# Patient Record
Sex: MALE | Race: WHITE | NOT HISPANIC OR LATINO | URBAN - METROPOLITAN AREA
[De-identification: names, ages, dates, MRNs, and addresses within clinical notes are randomized per-mention and may not be internally consistent; named-entity substitution may affect disease eponyms.]

---

## 2017-12-20 ENCOUNTER — APPOINTMENT (OUTPATIENT)
Dept: URBAN - METROPOLITAN AREA CLINIC 200 | Age: 57
Setting detail: DERMATOLOGY
End: 2017-12-29

## 2017-12-20 DIAGNOSIS — L57.8 OTHER SKIN CHANGES DUE TO CHRONIC EXPOSURE TO NONIONIZING RADIATION: ICD-10-CM

## 2017-12-20 PROCEDURE — OTHER COUNSELING: OTHER

## 2017-12-20 PROCEDURE — 99213 OFFICE O/P EST LOW 20 MIN: CPT

## 2017-12-20 ASSESSMENT — LOCATION SIMPLE DESCRIPTION DERM: LOCATION SIMPLE: RIGHT UPPER BACK

## 2017-12-20 ASSESSMENT — LOCATION DETAILED DESCRIPTION DERM: LOCATION DETAILED: RIGHT MEDIAL UPPER BACK

## 2017-12-20 ASSESSMENT — LOCATION ZONE DERM: LOCATION ZONE: TRUNK

## 2018-03-26 RX ORDER — AMLODIPINE BESYLATE 5 MG/1
TABLET ORAL
Qty: 90 TABLET | Refills: 2 | Status: SHIPPED | OUTPATIENT
Start: 2018-03-26 | End: 2018-12-22 | Stop reason: SDUPTHER

## 2018-08-03 ENCOUNTER — APPOINTMENT (OUTPATIENT)
Dept: URBAN - METROPOLITAN AREA CLINIC 200 | Age: 58
Setting detail: DERMATOLOGY
End: 2018-08-16

## 2018-08-03 DIAGNOSIS — L82.1 OTHER SEBORRHEIC KERATOSIS: ICD-10-CM

## 2018-08-03 DIAGNOSIS — B07.0 PLANTAR WART: ICD-10-CM

## 2018-08-03 PROCEDURE — OTHER REASSURANCE: OTHER

## 2018-08-03 PROCEDURE — OTHER LIQUID NITROGEN: OTHER

## 2018-08-03 PROCEDURE — 99212 OFFICE O/P EST SF 10 MIN: CPT | Mod: 25

## 2018-08-03 PROCEDURE — 17110 DESTRUCT B9 LESION 1-14: CPT

## 2018-08-03 ASSESSMENT — LOCATION ZONE DERM
LOCATION ZONE: TOE
LOCATION ZONE: FACE

## 2018-08-03 ASSESSMENT — LOCATION DETAILED DESCRIPTION DERM
LOCATION DETAILED: LEFT MEDIAL MALAR CHEEK
LOCATION DETAILED: LEFT 2ND TOE

## 2018-08-03 ASSESSMENT — LOCATION SIMPLE DESCRIPTION DERM
LOCATION SIMPLE: LEFT CHEEK
LOCATION SIMPLE: LEFT FOOT

## 2018-08-04 ENCOUNTER — APPOINTMENT (EMERGENCY)
Dept: RADIOLOGY | Facility: HOSPITAL | Age: 58
End: 2018-08-04
Attending: EMERGENCY MEDICINE
Payer: COMMERCIAL

## 2018-08-04 ENCOUNTER — HOSPITAL ENCOUNTER (EMERGENCY)
Facility: HOSPITAL | Age: 58
Discharge: HOME | End: 2018-08-04
Attending: EMERGENCY MEDICINE
Payer: COMMERCIAL

## 2018-08-04 VITALS
BODY MASS INDEX: 28.7 KG/M2 | SYSTOLIC BLOOD PRESSURE: 164 MMHG | RESPIRATION RATE: 18 BRPM | OXYGEN SATURATION: 97 % | DIASTOLIC BLOOD PRESSURE: 95 MMHG | HEART RATE: 95 BPM | HEIGHT: 71 IN | WEIGHT: 205 LBS | TEMPERATURE: 96.8 F

## 2018-08-04 DIAGNOSIS — S80.11XA CONTUSION OF RIGHT LOWER LEG, INITIAL ENCOUNTER: ICD-10-CM

## 2018-08-04 DIAGNOSIS — S80.11XA TRAUMATIC HEMATOMA OF RIGHT LOWER LEG, INITIAL ENCOUNTER: Primary | ICD-10-CM

## 2018-08-04 PROCEDURE — 90471 IMMUNIZATION ADMIN: CPT | Performed by: PHYSICIAN ASSISTANT

## 2018-08-04 PROCEDURE — 99283 EMERGENCY DEPT VISIT LOW MDM: CPT | Mod: 25

## 2018-08-04 PROCEDURE — 90715 TDAP VACCINE 7 YRS/> IM: CPT | Performed by: PHYSICIAN ASSISTANT

## 2018-08-04 PROCEDURE — 63600000 HC DRUGS/DETAIL CODE: Performed by: PHYSICIAN ASSISTANT

## 2018-08-04 PROCEDURE — 73590 X-RAY EXAM OF LOWER LEG: CPT | Mod: RT

## 2018-08-04 RX ORDER — AMLODIPINE BESYLATE 5 MG/1
5 TABLET ORAL
COMMUNITY
Start: 2017-03-07 | End: 2018-08-16 | Stop reason: SDUPTHER

## 2018-08-04 RX ORDER — OLMESARTAN MEDOXOMIL 40 MG/1
40 TABLET ORAL
COMMUNITY
Start: 2017-07-19 | End: 2019-11-25 | Stop reason: ALTCHOICE

## 2018-08-04 RX ADMIN — TETANUS TOXOID, REDUCED DIPHTHERIA TOXOID AND ACELLULAR PERTUSSIS VACCINE, ADSORBED 0.5 ML: 5; 2.5; 8; 8; 2.5 SUSPENSION INTRAMUSCULAR at 11:44

## 2018-08-04 ASSESSMENT — ENCOUNTER SYMPTOMS
ARTHRALGIAS: 0
NUMBNESS: 0
FEVER: 0
WEAKNESS: 0
HEADACHES: 0
SHORTNESS OF BREATH: 0
ABDOMINAL PAIN: 0

## 2018-08-04 NOTE — ED ATTESTATION NOTE
Procedures  Physical Exam  Review of Systems    8/4/201811:43 AM  I have personally seen and examined the patient.  I reviewed and agree with the PA/NP/Resident's assessment and plan of care.    My examination, assessment, and plan of care of Braeden Paige is as follows:  The patient presents with right knee pain status post striking his knee against a deck board around 10:30 in the morning.  Exam: There is left medial proximal tib-fib swelling and ecchymosis.  There is no evidence of compartment syndrome.  Impression/Plan: Imaging    There is no evidence of a compartment syndrome at this time.  I reviewed with the patient that if the swelling increases and he develops any neurologic or vascular symptoms distally did return to the emergency department immediately.  Patient was able to repeat these instructions to make     I was physically present for the key/critical portions of the following procedures: None     William Jordan MD  08/04/18 1149

## 2018-08-04 NOTE — ED PROVIDER NOTES
"HPI     Chief Complaint   Patient presents with   • Knee Pain       57-year-old male presents with right lower extremity injury that occurred at approximately 10:30 AM.  Patient states he is working on his deck and had a board pulled up, his right leg went through the boards and scraped against a wooden deck board.  He is severe swelling just inferior to the right knee.  Patient reports no pain with range of motion of his knee.  He has 3 out of 10 pain, slightly worse with ambulation, however he is able to ambulate without assistance. Denies a fall, head injury, or complaints otherwise.  Last tetanus unknown.             Patient History     Past Medical History:   Diagnosis Date   • Hypercholesteremia    • Hypertension        History reviewed. No pertinent surgical history.    History reviewed. No pertinent family history.    Social History   Substance Use Topics   • Smoking status: Current Some Day Smoker     Types: Cigars   • Smokeless tobacco: Never Used   • Alcohol use Yes      Comment: 10 drinks a week       Systems Reviewed from Nursing Triage:  Tobacco  Allergies  Meds  Problems  Med Hx  Surg Hx  Soc Hx         Review of Systems     Review of Systems   Constitutional: Negative for fever.   Respiratory: Negative for shortness of breath.    Cardiovascular: Negative for chest pain.   Gastrointestinal: Negative for abdominal pain.   Musculoskeletal: Negative for arthralgias and gait problem.   Neurological: Negative for weakness, numbness and headaches.        Physical Exam     ED Triage Vitals [08/04/18 1053]   Temp Heart Rate Resp BP SpO2   (!) 36 °C (96.8 °F) 95 18 (!) 164/95 97 %      Temp Source Heart Rate Source Patient Position BP Location FiO2 (%) (Set)   Temporal -- Sitting Right upper arm --                     Patient Vitals for the past 24 hrs:   BP Temp Temp src Pulse Resp SpO2 Height Weight   08/04/18 1053 (!) 164/95 (!) 36 °C (96.8 °F) Temporal 95 18 97 % 1.803 m (5' 11\") 93 kg (205 lb) "           Physical Exam   Constitutional: He appears well-developed. No distress.   HENT:   Head: Atraumatic.   Eyes: Conjunctivae are normal.   Neck: Normal range of motion.   Pulmonary/Chest: Effort normal.   Musculoskeletal:        Legs:  Compartments soft, no signs of compartment syndrome   Neurological: He is alert.   Skin: Skin is warm and dry.   Psychiatric: He has a normal mood and affect. His behavior is normal.   Nursing note and vitals reviewed.           Procedures    ED Course & [facility]     Labs Reviewed - No data to display    X-RAY TIBIA FIBULA RIGHT 2 VIEWS    (Results Pending)           MDM         ED Course as of Aug 04 1857   Sat Aug 04, 2018   1120 58 y/o male with right prox tib swelling after scraping against deck boards this morning, xray, ice, tdap, pt declined pain medication at this time; pt requesting MRI, I told him we are unable to get this done on an emergent basis but we can refer him to orthopedics if he is to need an MRI for his injury; he also was concerned about DVT, this is unlikely immediately after an injury but knows he does have a slight risk of developing one over the next few weeks and understands he may need an ultrasound; I did offer an ultrasound today for reassurance but he declined   [JL]   1216 Xray negative, tdap ordered, wounds cleaned with sam kleanse, ace applied to right leg, abx/band aid to left ankle; pt able to ambulate, will d/c home; d/w and evaluated by DR. Jordan   [JL]      ED Course User Index  [JL] YUMIKO Monterroso         Clinical Impressions as of Aug 04 1857   Traumatic hematoma of right lower leg, initial encounter   Contusion of right lower leg, initial encounter        YUMIKO Monterroso  08/04/18 1902

## 2018-08-04 NOTE — DISCHARGE INSTRUCTIONS
Ice 20 minutes on, 20 minutes off. Elevate above heart level. Wear ace wrap to help with swelling. Use anti-inflammatory medication such as Advil or Aleve, please take with food.     Your tetanus was updated today, please make note for your record.     Please follow up with your primary care doctor or orthopedic doctor within 1 week.     If you develop new/worsening symptoms including severe pain, numbness, tingling, cold/pale foot, fever >100.4F, difficulty walking or other concerning symptoms, return to the ER.

## 2018-08-06 ENCOUNTER — TELEPHONE (OUTPATIENT)
Dept: FAMILY MEDICINE | Facility: CLINIC | Age: 58
End: 2018-08-06

## 2018-08-06 NOTE — TELEPHONE ENCOUNTER
LM for pt re: ER visit. Checking in to see how he is feeling and to make sure he understands his discharge instructions. Pt to call the office to schedule a follow-up visit, if indicated.

## 2018-08-16 ENCOUNTER — OFFICE VISIT (OUTPATIENT)
Dept: FAMILY MEDICINE | Facility: CLINIC | Age: 58
End: 2018-08-16
Payer: COMMERCIAL

## 2018-08-16 VITALS
TEMPERATURE: 97.9 F | DIASTOLIC BLOOD PRESSURE: 80 MMHG | SYSTOLIC BLOOD PRESSURE: 124 MMHG | HEIGHT: 71 IN | BODY MASS INDEX: 28.14 KG/M2 | WEIGHT: 201 LBS | RESPIRATION RATE: 15 BRPM | HEART RATE: 68 BPM

## 2018-08-16 DIAGNOSIS — E78.00 PURE HYPERCHOLESTEROLEMIA: ICD-10-CM

## 2018-08-16 DIAGNOSIS — S80.11XD HEMATOMA OF LEG, RIGHT, SUBSEQUENT ENCOUNTER: Primary | ICD-10-CM

## 2018-08-16 DIAGNOSIS — I10 BENIGN ESSENTIAL HYPERTENSION: ICD-10-CM

## 2018-08-16 PROCEDURE — 36415 COLL VENOUS BLD VENIPUNCTURE: CPT | Performed by: FAMILY MEDICINE

## 2018-08-16 PROCEDURE — 99213 OFFICE O/P EST LOW 20 MIN: CPT | Mod: 25 | Performed by: FAMILY MEDICINE

## 2018-08-16 ASSESSMENT — ENCOUNTER SYMPTOMS
ABDOMINAL PAIN: 0
NAUSEA: 0
SHORTNESS OF BREATH: 0
VOMITING: 0
CHEST TIGHTNESS: 0

## 2018-08-16 NOTE — PROGRESS NOTES
Subjective      Patient ID: Braeden Paige is a 57 y.o. male.    HPI patient is here to follow-up on a leg hematoma which she scraped on part of a desk a couple of weeks ago, he was seen at the emergency room, the ER records were reviewed.  Patient reports continued slow improvement in the size of the hematoma although it is still present, still mildly painful with a 4 out of 10.  No increased redness, swelling, or fever.  Patient also has a history of hypertension high cholesterol he is here to follow-up on those.  His blood pressure is better controlled today, he reports he changed his diet has lost some weight and is trying to exercise.  We also increased his Crestor last time and he is due for recheck on blood work.  He denies any chest pain, shortness of breath or any other symptoms.    Allergies  Meds  Problems       Review of Systems   Respiratory: Negative for chest tightness and shortness of breath.    Cardiovascular: Negative for chest pain.   Gastrointestinal: Negative for abdominal pain, nausea and vomiting.   Skin: Negative for rash.     Allergies   Allergen Reactions   • Ace Inhibitors      Other reaction(s): cough     Current Outpatient Prescriptions   Medication Sig Dispense Refill   • amLODIPine (NORVASC) 5 mg tablet TAKE 1 TABLET BY ORAL ROUTE  EVERY DAY 90 tablet 2   • rosuvastatin (CRESTOR) 10 mg tablet TAKE ONE TABLET BY MOUTH ONCE DAILY  30 tablet 5     No current facility-administered medications for this visit.      Past Medical History:   Diagnosis Date   • Hypercholesteremia    • Hypertension      No past surgical history on file.  Social History   Substance Use Topics   • Smoking status: Current Some Day Smoker     Types: Cigars   • Smokeless tobacco: Never Used   • Alcohol use Yes      Comment: 10 drinks a week     No family history on file.    Objective   Vitals:    08/16/18 1123   BP: 124/80   Pulse: 68   Resp: 15   Temp: 36.6 °C (97.9 °F)   Weight: 91.2 kg (201 lb)   Height: 1.803  "m (5' 11\")    Body mass index is 28.03 kg/m².  Physical Exam   Constitutional: He is oriented to person, place, and time. He appears well-developed. No distress.   Musculoskeletal:   Right lower extremity with healing superficial scrapes along the distal medial tibia, there is a large area of soft tissue swelling roughly 10 x 5 cm oval overlying the medial proximal tibia, there is some mild overlying hyperemia without significant warmth or redness.  It is only very mildly tender to palpation.   Neurological: He is alert and oriented to person, place, and time.   Psychiatric: He has a normal mood and affect. His speech is normal and behavior is normal.       Assessment/Plan   Problem List Items Addressed This Visit     Benign essential hypertension    Pure hypercholesterolemia    Relevant Orders    Lipid panel    Comprehensive metabolic panel      Other Visit Diagnoses     Hematoma of leg, right, subsequent encounter    -  Primary      I discussed with the patient that I see no signs of infection, hematoma of the size will take a couple of months to completely resolve.  Follow-up if he has any concerns over its improvement.  His blood pressure is well controlled and we will recheck blood work today to reevaluate his cholesterol numbers on a higher dose of Crestor.  Continue current medications.    Patient agrees and verbalizes understanding of medication and treatment plan discussed at today's visit.  Patient was instructed to call or follow-up if symptoms persist or worsen.    No notes on file    Schuyler Butt, DO    This note was created with voice recognition software. Inadvertent dictation errors should be disregarded. Please contact my office for clarification.  "

## 2018-08-17 LAB
ALBUMIN SERPL-MCNC: 4.4 G/DL (ref 3.5–5.5)
ALBUMIN/GLOB SERPL: 1.7 {RATIO} (ref 1.2–2.2)
ALP SERPL-CCNC: 62 IU/L (ref 39–117)
ALT SERPL-CCNC: 25 IU/L (ref 0–44)
AST SERPL-CCNC: 22 IU/L (ref 0–40)
BILIRUB SERPL-MCNC: 0.5 MG/DL (ref 0–1.2)
BUN SERPL-MCNC: 16 MG/DL (ref 6–24)
BUN/CREAT SERPL: 18 (ref 9–20)
CALCIUM SERPL-MCNC: 9.5 MG/DL (ref 8.7–10.2)
CHLORIDE SERPL-SCNC: 99 MMOL/L (ref 96–106)
CHOLEST SERPL-MCNC: 189 MG/DL (ref 100–199)
CO2 SERPL-SCNC: 24 MMOL/L (ref 20–29)
CREAT SERPL-MCNC: 0.89 MG/DL (ref 0.76–1.27)
GLOBULIN SER CALC-MCNC: 2.6 G/DL (ref 1.5–4.5)
GLUCOSE SERPL-MCNC: 91 MG/DL (ref 65–99)
HDLC SERPL-MCNC: 61 MG/DL
LAB CORP EGFR IF AFRICN AM: 108 ML/MIN/1.73
LAB CORP EGFR IF NONAFRICN AM: 94 ML/MIN/1.73
LDLC SERPL CALC-MCNC: 113 MG/DL (ref 0–99)
POTASSIUM SERPL-SCNC: 4.8 MMOL/L (ref 3.5–5.2)
PROT SERPL-MCNC: 7 G/DL (ref 6–8.5)
SODIUM SERPL-SCNC: 142 MMOL/L (ref 134–144)
TRIGL SERPL-MCNC: 75 MG/DL (ref 0–149)
VLDLC SERPL CALC-MCNC: 15 MG/DL (ref 5–40)

## 2018-08-20 RX ORDER — OLMESARTAN MEDOXOMIL 40 MG/1
TABLET ORAL
Qty: 30 TABLET | Refills: 5 | Status: SHIPPED | OUTPATIENT
Start: 2018-08-20 | End: 2018-11-28 | Stop reason: SDUPTHER

## 2018-08-20 NOTE — TELEPHONE ENCOUNTER
----- Message from Schuyler Butt DO sent at 8/20/2018  7:21 AM EDT -----  Please notify patient that his cholesterol numbers were much better, total was 189, the bad one is down to 113, and his liver function tests are normal therefore continue current Crestor dose.

## 2018-08-20 NOTE — PROGRESS NOTES
Please notify patient that his cholesterol numbers were much better, total was 189, the bad one is down to 113, and his liver function tests are normal therefore continue current Crestor dose.

## 2018-08-30 ENCOUNTER — TELEPHONE (OUTPATIENT)
Dept: FAMILY MEDICINE | Facility: CLINIC | Age: 58
End: 2018-08-30

## 2018-08-30 NOTE — TELEPHONE ENCOUNTER
[facility] called they will be following up with patient upon arrival from [facility]. Patient should arrive home on Sat or Sun.

## 2018-09-06 ENCOUNTER — OFFICE VISIT (OUTPATIENT)
Dept: FAMILY MEDICINE | Facility: CLINIC | Age: 58
End: 2018-09-06
Payer: COMMERCIAL

## 2018-09-06 VITALS
HEIGHT: 71 IN | HEART RATE: 80 BPM | TEMPERATURE: 98.1 F | DIASTOLIC BLOOD PRESSURE: 78 MMHG | RESPIRATION RATE: 16 BRPM | WEIGHT: 192.6 LBS | BODY MASS INDEX: 26.96 KG/M2 | SYSTOLIC BLOOD PRESSURE: 142 MMHG

## 2018-09-06 DIAGNOSIS — D72.829 LEUKOCYTOSIS, UNSPECIFIED TYPE: ICD-10-CM

## 2018-09-06 DIAGNOSIS — L03.115 CELLULITIS OF RIGHT LOWER EXTREMITY: Primary | ICD-10-CM

## 2018-09-06 PROCEDURE — 99213 OFFICE O/P EST LOW 20 MIN: CPT | Performed by: FAMILY MEDICINE

## 2018-09-06 RX ORDER — CLINDAMYCIN HYDROCHLORIDE 300 MG/1
300 CAPSULE ORAL
COMMUNITY
Start: 2018-08-30 | End: 2018-09-06 | Stop reason: SDUPTHER

## 2018-09-06 RX ORDER — CLINDAMYCIN HYDROCHLORIDE 300 MG/1
300 CAPSULE ORAL 3 TIMES DAILY
Qty: 21 CAPSULE | Refills: 0 | Status: SHIPPED | OUTPATIENT
Start: 2018-09-06 | End: 2019-11-25 | Stop reason: ALTCHOICE

## 2018-09-06 NOTE — PROGRESS NOTES
"Subjective      Patient ID: Braeden Paige is a 57 y.o. male.    HPI patient reports that while on vacation the hematoma that was present on his right anterior leg became infected, he began to have more pain, redness actually began to run a fever and had some nausea.  He was admitted for 4 days, an abscess was found on CAT scan, surgery was consulted, they performed an incision and drainage and left the wound open.  He was treated with Rocephin and clindamycin, discharged on clindamycin.  His white count was elevated as high as 24,000 but improved somewhat during his hospital stay.  He has been having wound care and nurses coming for wound care.    Allergies  Meds  Problems       Review of Systems  Allergies   Allergen Reactions   • Ace Inhibitors      Other reaction(s): cough     Current Outpatient Prescriptions   Medication Sig Dispense Refill   • amLODIPine (NORVASC) 5 mg tablet TAKE 1 TABLET BY ORAL ROUTE  EVERY DAY 90 tablet 2   • clindamycin (CLEOCIN) 300 mg capsule Take 1 capsule (300 mg total) by mouth 3 (three) times a day for 7 days. 21 capsule 0   • olmesartan (BENICAR) 40 mg tablet TAKE 1 TABLET BY MOUTH EVERY DAY 30 tablet 5   • rosuvastatin (CRESTOR) 10 mg tablet TAKE ONE TABLET BY MOUTH ONCE DAILY  30 tablet 5     No current facility-administered medications for this visit.      Past Medical History:   Diagnosis Date   • Hypercholesteremia    • Hypertension      No past surgical history on file.  Social History   Substance Use Topics   • Smoking status: Current Some Day Smoker     Types: Cigars   • Smokeless tobacco: Never Used   • Alcohol use Yes      Comment: 10 drinks a week     No family history on file.    Objective   Vitals:    09/06/18 1347   BP: (!) 142/78   Pulse: 80   Resp: 16   Temp: 36.7 °C (98.1 °F)   Weight: 87.4 kg (192 lb 9.6 oz)   Height: 1.803 m (5' 11\")    Body mass index is 26.86 kg/m².  Physical Exam   Constitutional: He is oriented to person, place, and time. He appears " well-developed. No distress.   Musculoskeletal:   Patient's right leg was examined, he still has some soft tissue swelling present just below the knee anterior and medial just above the wound, the wound is roughly 2 cm long, packed with normal saline, no significant surrounding redness, residual soft tissue swelling is present.  No warmth.   Neurological: He is alert and oriented to person, place, and time.   Psychiatric: He has a normal mood and affect. His speech is normal and behavior is normal.       Assessment/Plan   Problem List Items Addressed This Visit     None      Visit Diagnoses     Cellulitis of right lower extremity    -  Primary    Relevant Orders    CBC and Differential    Leukocytosis, unspecified type          Patient's infection has significantly improved clinically, I would like to continue clindamycin for 7 more days at 3 times a day for continued treatment and because of his significant wound.  Patient is comfortable doing wound care himself and therefore he may return to work on Monday.  He understands to keep the wound protected and clean with twice daily dressing changes.  Recheck CBC today.    Patient agrees and verbalizes understanding of medication and treatment plan discussed at today's visit.  Patient was instructed to call or follow-up if symptoms persist or worsen.    No notes on file    Schuyler Butt, DO    This note was created with voice recognition software. Inadvertent dictation errors should be disregarded. Please contact my office for clarification.

## 2018-09-07 LAB
BASOPHILS # BLD AUTO: 0 X10E3/UL (ref 0–0.2)
BASOPHILS NFR BLD AUTO: 0 %
EOSINOPHIL # BLD AUTO: 0.1 X10E3/UL (ref 0–0.4)
EOSINOPHIL NFR BLD AUTO: 1 %
ERYTHROCYTE [DISTWIDTH] IN BLOOD BY AUTOMATED COUNT: 13.4 % (ref 12.3–15.4)
HCT VFR BLD AUTO: 43.6 % (ref 37.5–51)
HGB BLD-MCNC: 14.7 G/DL (ref 13–17.7)
IMM GRANULOCYTES # BLD: 0.1 X10E3/UL (ref 0–0.1)
IMM GRANULOCYTES NFR BLD: 1 %
LYMPHOCYTES # BLD AUTO: 1.6 X10E3/UL (ref 0.7–3.1)
LYMPHOCYTES NFR BLD AUTO: 15 %
MCH RBC QN AUTO: 31.3 PG (ref 26.6–33)
MCHC RBC AUTO-ENTMCNC: 33.7 G/DL (ref 31.5–35.7)
MCV RBC AUTO: 93 FL (ref 79–97)
MONOCYTES # BLD AUTO: 0.9 X10E3/UL (ref 0.1–0.9)
MONOCYTES NFR BLD AUTO: 9 %
NEUTROPHILS # BLD AUTO: 7.8 X10E3/UL (ref 1.4–7)
NEUTROPHILS NFR BLD AUTO: 74 %
PLATELET # BLD AUTO: 374 X10E3/UL (ref 150–379)
RBC # BLD AUTO: 4.7 X10E6/UL (ref 4.14–5.8)
WBC # BLD AUTO: 10.5 X10E3/UL (ref 3.4–10.8)

## 2018-10-12 ENCOUNTER — OFFICE VISIT (OUTPATIENT)
Dept: FAMILY MEDICINE | Facility: CLINIC | Age: 58
End: 2018-10-12
Payer: COMMERCIAL

## 2018-10-12 VITALS
TEMPERATURE: 98.2 F | HEIGHT: 71 IN | BODY MASS INDEX: 28 KG/M2 | SYSTOLIC BLOOD PRESSURE: 124 MMHG | WEIGHT: 200 LBS | HEART RATE: 84 BPM | DIASTOLIC BLOOD PRESSURE: 90 MMHG | RESPIRATION RATE: 13 BRPM

## 2018-10-12 DIAGNOSIS — S80.11XS HEMATOMA OF LEG, RIGHT, SEQUELA: Primary | ICD-10-CM

## 2018-10-12 PROCEDURE — 90686 IIV4 VACC NO PRSV 0.5 ML IM: CPT | Performed by: FAMILY MEDICINE

## 2018-10-12 PROCEDURE — 99213 OFFICE O/P EST LOW 20 MIN: CPT | Mod: 25 | Performed by: FAMILY MEDICINE

## 2018-10-12 PROCEDURE — 90471 IMMUNIZATION ADMIN: CPT | Performed by: FAMILY MEDICINE

## 2018-10-12 NOTE — PROGRESS NOTES
"Subjective      Patient ID: Braeden Paige is a 58 y.o. male.    HPI patient is here to follow-up on his leg injury.  He continues to have an abnormal swelling above the level of the injury, the surgical site has healed very well.  He is not having pain at all in the leg whatsoever, his activities are full, he was just wondering about the swelling he has.    Allergies  Meds  Problems       Review of Systems  Allergies   Allergen Reactions   • Ace Inhibitors      Other reaction(s): cough     Current Outpatient Prescriptions   Medication Sig Dispense Refill   • amLODIPine (NORVASC) 5 mg tablet TAKE 1 TABLET BY ORAL ROUTE  EVERY DAY 90 tablet 2   • olmesartan (BENICAR) 40 mg tablet TAKE 1 TABLET BY MOUTH EVERY DAY 30 tablet 5   • rosuvastatin (CRESTOR) 10 mg tablet TAKE ONE TABLET BY MOUTH ONCE DAILY  30 tablet 5     No current facility-administered medications for this visit.      Past Medical History:   Diagnosis Date   • Hypercholesteremia    • Hypertension      No past surgical history on file.  Social History   Substance Use Topics   • Smoking status: Current Some Day Smoker     Types: Cigars   • Smokeless tobacco: Never Used   • Alcohol use Yes      Comment: 10 drinks a week     No family history on file.    Objective   Vitals:    10/12/18 1017   BP: 124/90   Pulse: 84   Resp: 13   Temp: 36.8 °C (98.2 °F)   Weight: 90.7 kg (200 lb)   Height: 1.803 m (5' 11\")    Body mass index is 27.89 kg/m².  Physical Exam   Constitutional: He is oriented to person, place, and time. He appears well-developed. No distress.   Musculoskeletal:   Lateral lower extremities examined, right leg displays a 2 x 3 cm rounded area of prominent tissue that is firm to the touch just above the surgical site.  Surgical site is well-healed, there is trace lower extremity edema below.  No redness or warmth.  No tenderness whatsoever.   Neurological: He is alert and oriented to person, place, and time.   Psychiatric: He has a normal mood and " affect. His speech is normal and behavior is normal.       Assessment/Plan   Problem List Items Addressed This Visit     None      Visit Diagnoses     Hematoma of leg, right, sequela    -  Primary      I believe the area that is firm to palpation is likely periosteal reaction of the proximal tibia, the hematoma has resolved.  The wound has healed and he has no tenderness.  I have reassured the patient that I believe this will take months to remodel.  If any symptoms worsen or this area enlarges he is to follow-up for further evaluation.  Flu shot given.    Patient agrees and verbalizes understanding of medication and treatment plan discussed at today's visit.  Patient was instructed to call or follow-up if symptoms persist or worsen.    No notes on file    Schuyler Butt, DO    This note was created with voice recognition software. Inadvertent dictation errors should be disregarded. Please contact my office for clarification.

## 2018-11-12 RX ORDER — ROSUVASTATIN CALCIUM 10 MG/1
10 TABLET, COATED ORAL
Qty: 30 TABLET | Refills: 5 | Status: SHIPPED | OUTPATIENT
Start: 2018-11-12 | End: 2018-11-28 | Stop reason: SDUPTHER

## 2018-11-28 RX ORDER — OLMESARTAN MEDOXOMIL 40 MG/1
40 TABLET ORAL
Qty: 90 TABLET | Refills: 1 | Status: SHIPPED | OUTPATIENT
Start: 2018-11-28 | End: 2019-11-25 | Stop reason: ALTCHOICE

## 2018-11-28 RX ORDER — ROSUVASTATIN CALCIUM 10 MG/1
10 TABLET, COATED ORAL
Qty: 30 TABLET | Refills: 1 | Status: SHIPPED | OUTPATIENT
Start: 2018-11-28 | End: 2019-01-15 | Stop reason: SDUPTHER

## 2018-12-17 ENCOUNTER — APPOINTMENT (OUTPATIENT)
Dept: URBAN - METROPOLITAN AREA CLINIC 200 | Age: 58
Setting detail: DERMATOLOGY
End: 2018-12-19

## 2018-12-17 DIAGNOSIS — L82.0 INFLAMED SEBORRHEIC KERATOSIS: ICD-10-CM

## 2018-12-17 DIAGNOSIS — L57.8 OTHER SKIN CHANGES DUE TO CHRONIC EXPOSURE TO NONIONIZING RADIATION: ICD-10-CM

## 2018-12-17 PROBLEM — D23.9 OTHER BENIGN NEOPLASM OF SKIN, UNSPECIFIED: Status: ACTIVE | Noted: 2018-12-17

## 2018-12-17 PROCEDURE — OTHER REASSURANCE: OTHER

## 2018-12-17 PROCEDURE — 99213 OFFICE O/P EST LOW 20 MIN: CPT

## 2018-12-17 PROCEDURE — OTHER MIPS QUALITY: OTHER

## 2018-12-17 PROCEDURE — OTHER COUNSELING: OTHER

## 2018-12-17 ASSESSMENT — LOCATION ZONE DERM
LOCATION ZONE: TRUNK
LOCATION ZONE: ARM

## 2018-12-17 ASSESSMENT — LOCATION DETAILED DESCRIPTION DERM
LOCATION DETAILED: LEFT MEDIAL SUPERIOR CHEST
LOCATION DETAILED: RIGHT ANTERIOR PROXIMAL UPPER ARM

## 2018-12-17 ASSESSMENT — LOCATION SIMPLE DESCRIPTION DERM
LOCATION SIMPLE: RIGHT UPPER ARM
LOCATION SIMPLE: CHEST

## 2019-01-15 RX ORDER — ROSUVASTATIN CALCIUM 10 MG/1
10 TABLET, COATED ORAL
Qty: 90 TABLET | Refills: 0 | Status: SHIPPED | OUTPATIENT
Start: 2019-01-15 | End: 2019-05-06 | Stop reason: SDUPTHER

## 2019-01-25 ENCOUNTER — OFFICE VISIT (OUTPATIENT)
Dept: FAMILY MEDICINE | Facility: CLINIC | Age: 59
End: 2019-01-25
Payer: COMMERCIAL

## 2019-01-25 VITALS
RESPIRATION RATE: 14 BRPM | HEART RATE: 68 BPM | WEIGHT: 206 LBS | BODY MASS INDEX: 28.84 KG/M2 | TEMPERATURE: 98.1 F | DIASTOLIC BLOOD PRESSURE: 84 MMHG | SYSTOLIC BLOOD PRESSURE: 122 MMHG | HEIGHT: 71 IN

## 2019-01-25 DIAGNOSIS — J01.90 ACUTE NON-RECURRENT SINUSITIS, UNSPECIFIED LOCATION: Primary | ICD-10-CM

## 2019-01-25 DIAGNOSIS — R73.9 HYPERGLYCEMIA: ICD-10-CM

## 2019-01-25 PROCEDURE — 99214 OFFICE O/P EST MOD 30 MIN: CPT | Performed by: FAMILY MEDICINE

## 2019-01-25 RX ORDER — AMOXICILLIN 875 MG/1
875 TABLET, FILM COATED ORAL 2 TIMES DAILY
Qty: 20 TABLET | Refills: 0 | Status: SHIPPED | OUTPATIENT
Start: 2019-01-25 | End: 2019-02-04 | Stop reason: ALTCHOICE

## 2019-01-25 NOTE — PROGRESS NOTES
"Subjective      Patient ID: Braeden Paige is a 58 y.o. male.    HPI patient is here for sinus symptoms that are been present for about 7 days.  He has runny nose, nasal congestion, lots of sinus drainage and some pressure.  Really has not improved significantly.  He has had no fever or chest pain or shortness of breath.  He does have a cough.  No nausea, vomiting, or diarrhea.  He also has a history of borderline blood sugars and his vision had changed recently so he wanted to have his blood sugar checked.    Allergies  Meds  Problems       Review of Systems  Allergies   Allergen Reactions   • Ace Inhibitors      Other reaction(s): cough     Current Outpatient Prescriptions   Medication Sig Dispense Refill   • amLODIPine (NORVASC) 5 mg tablet TAKE ONE TABLET BY MOUTH ONE TIME DAILY  90 tablet 3   • amoxicillin (AMOXIL) 875 mg tablet Take 1 tablet (875 mg total) by mouth 2 (two) times a day for 10 days. 20 tablet 0   • olmesartan (BENICAR) 40 mg tablet Take 1 tablet (40 mg total) by mouth once daily. 90 tablet 1   • rosuvastatin (CRESTOR) 10 mg tablet Take 1 tablet (10 mg total) by mouth once daily. 90 tablet 0     No current facility-administered medications for this visit.      Past Medical History:   Diagnosis Date   • Hypercholesteremia    • Hypertension      No past surgical history on file.  Social History   Substance Use Topics   • Smoking status: Current Some Day Smoker     Types: Cigars   • Smokeless tobacco: Never Used   • Alcohol use Yes      Comment: 10 drinks a week     No family history on file.    Objective   Vitals:    01/25/19 1253   BP: 122/84   Pulse: 68   Resp: 14   Temp: 36.7 °C (98.1 °F)   Weight: 93.4 kg (206 lb)   Height: 1.803 m (5' 11\")    Body mass index is 28.73 kg/m².  Physical Exam   Constitutional: He appears well-developed.  Non-toxic appearance. No distress.   HENT:   Head: Normocephalic.   Right Ear: Tympanic membrane, external ear and ear canal normal.   Left Ear: Tympanic " membrane, external ear and ear canal normal.   Nose: Nose normal.   Mouth/Throat: Uvula is midline, oropharynx is clear and moist and mucous membranes are normal. No oral lesions. No oropharyngeal exudate, posterior oropharyngeal edema, posterior oropharyngeal erythema or tonsillar abscesses.   Tongue normal   Eyes: Conjunctivae and lids are normal.   Neck: Neck supple. No thyroid mass and no thyromegaly present.   Cardiovascular: Normal rate, regular rhythm and normal heart sounds.    No murmur heard.  Pulmonary/Chest: Effort normal and breath sounds normal. No respiratory distress.   Lymphadenopathy:        Head (right side): No submental, no submandibular, no preauricular, no posterior auricular and no occipital adenopathy present.        Head (left side): No submental, no submandibular, no preauricular, no posterior auricular and no occipital adenopathy present.     He has no cervical adenopathy.   Psychiatric: He has a normal mood and affect.   Alert and oriented       Assessment/Plan   Problem List Items Addressed This Visit     None      Visit Diagnoses     Acute non-recurrent sinusitis, unspecified location    -  Primary    Hyperglycemia        Relevant Orders    Hemoglobin A1c      Treat sinusitis with amoxicillin twice daily for 10 days, check hemoglobin A1c today for further evaluation.    Patient agrees and verbalizes understanding of medication and treatment plan discussed at today's visit.  Patient was instructed to call or follow-up if symptoms persist or worsen.    No notes on file    Schuyler Butt, DO    This note was created with voice recognition software. Inadvertent dictation errors should be disregarded. Please contact my office for clarification.

## 2019-01-26 LAB — HBA1C MFR BLD: 5.8 % (ref 4.8–5.6)

## 2019-01-28 NOTE — PROGRESS NOTES
Braeden, your long-term blood sugar test was slightly higher than last time at 5.8, but I do not think that significant enough to cause changes in your vision.

## 2019-02-04 ENCOUNTER — TELEPHONE (OUTPATIENT)
Dept: FAMILY MEDICINE | Facility: CLINIC | Age: 59
End: 2019-02-04

## 2019-02-04 RX ORDER — AZITHROMYCIN 250 MG/1
TABLET, FILM COATED ORAL
Qty: 6 TABLET | Refills: 0 | Status: SHIPPED | OUTPATIENT
Start: 2019-02-04 | End: 2019-02-13 | Stop reason: SDUPTHER

## 2019-02-13 ENCOUNTER — TELEPHONE (OUTPATIENT)
Dept: FAMILY MEDICINE | Facility: CLINIC | Age: 59
End: 2019-02-13

## 2019-02-13 RX ORDER — AZITHROMYCIN 250 MG/1
TABLET, FILM COATED ORAL
Qty: 6 TABLET | Refills: 0 | Status: SHIPPED | OUTPATIENT
Start: 2019-02-13 | End: 2019-11-25 | Stop reason: ALTCHOICE

## 2019-02-22 ENCOUNTER — APPOINTMENT (OUTPATIENT)
Dept: URBAN - METROPOLITAN AREA CLINIC 200 | Age: 59
Setting detail: DERMATOLOGY
End: 2019-02-26

## 2019-02-22 ENCOUNTER — RX ONLY (RX ONLY)
Age: 59
End: 2019-02-22

## 2019-02-22 DIAGNOSIS — L82.1 OTHER SEBORRHEIC KERATOSIS: ICD-10-CM

## 2019-02-22 PROCEDURE — OTHER BENIGN DESTRUCTION COSMETIC: OTHER

## 2019-02-22 RX ORDER — NYSTATIN AND TRIAMCINOLONE ACETONIDE 100000; 1 [USP'U]/G; MG/G
OINTMENT TOPICAL
Qty: 1 | Refills: 2 | Status: ERX | COMMUNITY
Start: 2019-02-22

## 2019-02-22 ASSESSMENT — LOCATION DETAILED DESCRIPTION DERM
LOCATION DETAILED: LEFT MEDIAL MALAR CHEEK
LOCATION DETAILED: LEFT SUPERIOR LATERAL MALAR CHEEK

## 2019-02-22 ASSESSMENT — LOCATION SIMPLE DESCRIPTION DERM: LOCATION SIMPLE: LEFT CHEEK

## 2019-02-22 ASSESSMENT — LOCATION ZONE DERM: LOCATION ZONE: FACE

## 2019-02-22 NOTE — PROCEDURE: BENIGN DESTRUCTION COSMETIC
Detail Level: Zone
Post-Care Instructions: I reviewed with the patient in detail post-care instructions. Patient is to wear sunprotection, and avoid picking at any of the treated lesions. Pt may apply Vaseline to crusted or scabbing areas.
Price (Use Numbers Only, No Special Characters Or $): 25.00
Consent: The patient's consent was obtained including but not limited to risks of crusting, scabbing, blistering, scarring, darker or lighter pigmentary change, recurrence, incomplete removal and infection.
Anesthesia Volume In Cc: 0

## 2019-03-01 ENCOUNTER — OFFICE VISIT (OUTPATIENT)
Dept: FAMILY MEDICINE | Facility: CLINIC | Age: 59
End: 2019-03-01
Payer: COMMERCIAL

## 2019-03-01 VITALS
BODY MASS INDEX: 29.54 KG/M2 | SYSTOLIC BLOOD PRESSURE: 126 MMHG | HEART RATE: 72 BPM | RESPIRATION RATE: 13 BRPM | WEIGHT: 211 LBS | DIASTOLIC BLOOD PRESSURE: 84 MMHG | TEMPERATURE: 98.1 F | HEIGHT: 71 IN

## 2019-03-01 DIAGNOSIS — J01.90 ACUTE NON-RECURRENT SINUSITIS, UNSPECIFIED LOCATION: Primary | ICD-10-CM

## 2019-03-01 PROCEDURE — 99214 OFFICE O/P EST MOD 30 MIN: CPT | Performed by: FAMILY MEDICINE

## 2019-03-01 RX ORDER — NYSTATIN AND TRIAMCINOLONE ACETONIDE 100000; 1 [USP'U]/G; MG/G
OINTMENT TOPICAL
COMMUNITY
Start: 2019-02-22

## 2019-03-01 RX ORDER — CEFUROXIME AXETIL 500 MG/1
500 TABLET ORAL 2 TIMES DAILY
Qty: 20 TABLET | Refills: 0 | Status: SHIPPED | OUTPATIENT
Start: 2019-03-01 | End: 2019-11-25 | Stop reason: ALTCHOICE

## 2019-03-01 NOTE — PROGRESS NOTES
"Subjective      Patient ID: Braeden Paige is a 58 y.o. male.    HPI patient is here for continuing cough and sinus drainage.  Has been present for about 3 weeks, he is tried Zithromax which has not improved things.  The cough has not improved in the last week.  He does not have shortness of breath or fever.  He does not have sinus pressure but does continue to have a postnasal drip and drainage.  No nausea, vomiting, or diarrhea.    Allergies  Meds  Problems       Review of Systems  Allergies   Allergen Reactions   • Ace Inhibitors      Other reaction(s): cough     Current Outpatient Prescriptions   Medication Sig Dispense Refill   • amLODIPine (NORVASC) 5 mg tablet TAKE ONE TABLET BY MOUTH ONE TIME DAILY  90 tablet 3   • azithromycin (ZITHROMAX) 250 mg tablet Take 2 tablets the first day, then 1 tablet daily for 4 days. 6 tablet 0   • cefuroxime (CEFTIN) 500 mg tablet Take 1 tablet (500 mg total) by mouth 2 (two) times a day for 10 days. 20 tablet 0   • nystatin-triamcinolone (MYCOLOG II) ointment      • olmesartan (BENICAR) 40 mg tablet Take 1 tablet (40 mg total) by mouth once daily. 90 tablet 1   • rosuvastatin (CRESTOR) 10 mg tablet Take 1 tablet (10 mg total) by mouth once daily. 90 tablet 0     No current facility-administered medications for this visit.      Past Medical History:   Diagnosis Date   • Hypercholesteremia    • Hypertension      No past surgical history on file.  Social History   Substance Use Topics   • Smoking status: Current Some Day Smoker     Types: Cigars   • Smokeless tobacco: Never Used   • Alcohol use Yes      Comment: 10 drinks a week     No family history on file.    Objective   Vitals:    03/01/19 1306   BP: 126/84   Pulse: 72   Resp: 13   Temp: 36.7 °C (98.1 °F)   Weight: 95.7 kg (211 lb)   Height: 1.803 m (5' 11\")    Body mass index is 29.43 kg/m².  Physical Exam   Constitutional: He appears well-developed.  Non-toxic appearance. No distress.   HENT:   Head: Normocephalic. "   Right Ear: Tympanic membrane, external ear and ear canal normal.   Left Ear: Tympanic membrane, external ear and ear canal normal.   Nose: Nose normal.   Mouth/Throat: Uvula is midline, oropharynx is clear and moist and mucous membranes are normal. No oral lesions. No oropharyngeal exudate, posterior oropharyngeal edema, posterior oropharyngeal erythema or tonsillar abscesses.   Tongue normal   Eyes: Conjunctivae and lids are normal.   Neck: Neck supple. No thyroid mass and no thyromegaly present.   Cardiovascular: Normal rate, regular rhythm and normal heart sounds.    No murmur heard.  Pulmonary/Chest: Effort normal and breath sounds normal. No respiratory distress.   Lymphadenopathy:        Head (right side): No submental, no submandibular, no preauricular, no posterior auricular and no occipital adenopathy present.        Head (left side): No submental, no submandibular, no preauricular, no posterior auricular and no occipital adenopathy present.     He has no cervical adenopathy.   Psychiatric: He has a normal mood and affect.   Alert and oriented       Assessment/Plan   Problem List Items Addressed This Visit     None      Visit Diagnoses     Acute non-recurrent sinusitis, unspecified location    -  Primary      Treat with Ceftin twice daily for 10 days.  The cough will most likely resolve as the sinus drainage stops.  Side effects discussed.  Patient agrees and verbalizes understanding of medication and treatment plan discussed at today's visit.  Patient was instructed to call or follow-up if symptoms persist or worsen.    No notes on file    Schuyler Butt, DO    This note was created with voice recognition software. Inadvertent dictation errors should be disregarded. Please contact my office for clarification.

## 2019-03-22 ENCOUNTER — TELEPHONE (OUTPATIENT)
Dept: FAMILY MEDICINE | Facility: CLINIC | Age: 59
End: 2019-03-22

## 2019-03-22 RX ORDER — TELMISARTAN 80 MG/1
80 TABLET ORAL DAILY
Qty: 90 TABLET | Refills: 3 | Status: SHIPPED | OUTPATIENT
Start: 2019-03-22 | End: 2023-09-05 | Stop reason: ALTCHOICE

## 2019-03-22 NOTE — TELEPHONE ENCOUNTER
[facility] - benicar on back order asking if losartan could be substituted. If so, would you send a script

## 2019-05-06 RX ORDER — ROSUVASTATIN CALCIUM 10 MG/1
TABLET, COATED ORAL
Qty: 90 TABLET | Refills: 0 | Status: SHIPPED | OUTPATIENT
Start: 2019-05-06 | End: 2019-08-08 | Stop reason: SDUPTHER

## 2019-11-04 RX ORDER — ROSUVASTATIN CALCIUM 10 MG/1
TABLET, COATED ORAL
Qty: 90 TABLET | Refills: 0 | Status: SHIPPED | OUTPATIENT
Start: 2019-11-04 | End: 2020-01-29

## 2019-11-04 NOTE — TELEPHONE ENCOUNTER
Medicine Refill Request    Last Office Visit: 3/1/2019  Next Office Visit: 11/25/2019        Current Outpatient Medications:   •  amLODIPine (NORVASC) 5 mg tablet, TAKE ONE TABLET BY MOUTH ONE TIME DAILY , Disp: 90 tablet, Rfl: 3  •  azithromycin (ZITHROMAX) 250 mg tablet, Take 2 tablets the first day, then 1 tablet daily for 4 days., Disp: 6 tablet, Rfl: 0  •  nystatin-triamcinolone (MYCOLOG II) ointment, , Disp: , Rfl:   •  olmesartan (BENICAR) 40 mg tablet, Take 1 tablet (40 mg total) by mouth once daily., Disp: 90 tablet, Rfl: 1  •  rosuvastatin (CRESTOR) 10 mg tablet, TAKE ONE TABLET BY MOUTH ONE TIME DAILY, Disp: 90 tablet, Rfl: 0  •  telmisartan (MICARDIS) 80 mg tablet, Take 1 tablet (80 mg total) by mouth daily., Disp: 90 tablet, Rfl: 3      BP Readings from Last 3 Encounters:   03/01/19 126/84   01/25/19 122/84   10/12/18 124/90       Recent Lab results:  Lab Results   Component Value Date    CHOL 189 08/16/2018   ,   Lab Results   Component Value Date    HDL 61 08/16/2018   ,   Lab Results   Component Value Date    LDLCALC 113 (H) 08/16/2018   ,   Lab Results   Component Value Date    TRIG 75 08/16/2018        Lab Results   Component Value Date    GLUCOSE 91 08/16/2018   ,   Lab Results   Component Value Date    HGBA1C 5.8 (H) 01/25/2019         Lab Results   Component Value Date    CREATININE 0.89 08/16/2018       Lab Results   Component Value Date    TSH 1.390 11/05/2015

## 2019-11-25 ENCOUNTER — OFFICE VISIT (OUTPATIENT)
Dept: FAMILY MEDICINE | Facility: CLINIC | Age: 59
End: 2019-11-25
Payer: COMMERCIAL

## 2019-11-25 VITALS
WEIGHT: 198.8 LBS | HEART RATE: 61 BPM | RESPIRATION RATE: 16 BRPM | TEMPERATURE: 97.8 F | DIASTOLIC BLOOD PRESSURE: 78 MMHG | HEIGHT: 69 IN | SYSTOLIC BLOOD PRESSURE: 130 MMHG | BODY MASS INDEX: 29.44 KG/M2

## 2019-11-25 DIAGNOSIS — M25.512 CHRONIC LEFT SHOULDER PAIN: ICD-10-CM

## 2019-11-25 DIAGNOSIS — R73.9 HYPERGLYCEMIA: ICD-10-CM

## 2019-11-25 DIAGNOSIS — E78.00 PURE HYPERCHOLESTEROLEMIA: ICD-10-CM

## 2019-11-25 DIAGNOSIS — G89.29 CHRONIC LEFT SHOULDER PAIN: ICD-10-CM

## 2019-11-25 DIAGNOSIS — I10 BENIGN ESSENTIAL HYPERTENSION: ICD-10-CM

## 2019-11-25 DIAGNOSIS — Z12.11 ENCOUNTER FOR SCREENING COLONOSCOPY: ICD-10-CM

## 2019-11-25 DIAGNOSIS — Z00.00 ROUTINE MEDICAL EXAM: Primary | ICD-10-CM

## 2019-11-25 PROCEDURE — 36415 COLL VENOUS BLD VENIPUNCTURE: CPT | Performed by: FAMILY MEDICINE

## 2019-11-25 PROCEDURE — 99396 PREV VISIT EST AGE 40-64: CPT | Performed by: FAMILY MEDICINE

## 2019-11-25 RX ORDER — OLMESARTAN MEDOXOMIL 40 MG/1
40 TABLET ORAL DAILY
Qty: 90 TABLET | Refills: 1 | Status: SHIPPED | OUTPATIENT
Start: 2019-11-25 | End: 2020-05-18

## 2019-11-25 ASSESSMENT — ENCOUNTER SYMPTOMS
EYE PAIN: 0
HEMATURIA: 0
ENDOCRINE NEGATIVE: 1
FATIGUE: 0
CONSTIPATION: 0
NERVOUS/ANXIOUS: 0
VOMITING: 0
COUGH: 0
DIARRHEA: 0
SHORTNESS OF BREATH: 0
BLOOD IN STOOL: 0
DYSURIA: 0
BRUISES/BLEEDS EASILY: 0
PALPITATIONS: 0
NUMBNESS: 0
EYE REDNESS: 0
DYSPHORIC MOOD: 0
LIGHT-HEADEDNESS: 0
SORE THROAT: 0
FREQUENCY: 0
NAUSEA: 0
DIFFICULTY URINATING: 0
NECK PAIN: 0
MYALGIAS: 0
BACK PAIN: 0
CHEST TIGHTNESS: 0
FEVER: 0
CHILLS: 0
HEADACHES: 0
ADENOPATHY: 0
ABDOMINAL PAIN: 0
UNEXPECTED WEIGHT CHANGE: 0
SINUS PRESSURE: 0
DIZZINESS: 0
APPETITE CHANGE: 0
SEIZURES: 0
ARTHRALGIAS: 1
PHOTOPHOBIA: 0
DIAPHORESIS: 0

## 2019-11-25 NOTE — PROGRESS NOTES
Subjective      Patient ID: Braeden Paige is a 59 y.o. male.    HPI patient is here for routine exam.  He has no concerns at this time.  He is actually decreased alcohol significantly, changed his eating habits, is exercising routinely decent amount of weight.  He feels very well except for chronic left shoulder pain which continues, patient reports in the arc of motion greater than 90 degrees in abduction patient has discomfort.  Not located at the AC joint.  No injury.    Tobacco  Allergies  Meds  Problems  Med Hx  Surg Hx  Fam Hx       Review of Systems   Constitutional: Negative for appetite change, chills, diaphoresis, fatigue, fever and unexpected weight change.   HENT: Negative for congestion, ear pain, hearing loss, nosebleeds, postnasal drip, sinus pressure and sore throat.    Eyes: Negative for photophobia, pain, redness and visual disturbance.   Respiratory: Negative for cough, chest tightness and shortness of breath.    Cardiovascular: Negative for chest pain and palpitations.   Gastrointestinal: Negative for abdominal pain, blood in stool, constipation, diarrhea, nausea and vomiting.   Endocrine: Negative.  Negative for cold intolerance and heat intolerance.   Genitourinary: Negative for difficulty urinating, dysuria, frequency, hematuria, testicular pain and urgency.   Musculoskeletal: Positive for arthralgias. Negative for back pain, gait problem, myalgias and neck pain.   Skin: Negative for rash.   Neurological: Negative for dizziness, seizures, syncope, light-headedness, numbness and headaches.   Hematological: Negative for adenopathy. Does not bruise/bleed easily.   Psychiatric/Behavioral: Negative for dysphoric mood. The patient is not nervous/anxious.      Allergies   Allergen Reactions   • Ace Inhibitors      Other reaction(s): cough     Current Outpatient Medications   Medication Sig Dispense Refill   • amLODIPine (NORVASC) 5 mg tablet TAKE ONE TABLET BY MOUTH ONE TIME DAILY  90  "tablet 3   • nystatin-triamcinolone (MYCOLOG II) ointment      • olmesartan (BENICAR) 40 mg tablet Take 1 tablet (40 mg total) by mouth daily. Switch back to olmesartan if available 90 tablet 1   • rosuvastatin (CRESTOR) 10 mg tablet TAKE ONE TABLET BY MOUTH ONE TIME DAILY  90 tablet 0   • telmisartan (MICARDIS) 80 mg tablet Take 1 tablet (80 mg total) by mouth daily. 90 tablet 3     No current facility-administered medications for this visit.      Past Medical History:   Diagnosis Date   • Hypercholesteremia    • Hypertension      Past Surgical History:   Procedure Laterality Date   • TONSILLECTOMY       Social History     Tobacco Use   • Smoking status: Current Some Day Smoker     Types: Cigars   • Smokeless tobacco: Never Used   Substance Use Topics   • Alcohol use: Yes     Comment: 10 drinks a week   • Drug use: No     History reviewed. No pertinent family history.    Objective   Vitals:    11/25/19 0936   BP: 130/78   Pulse: 61   Resp: 16   Temp: 36.6 °C (97.8 °F)   Weight: 90.2 kg (198 lb 12.8 oz)   Height: 1.753 m (5' 9\")    Body mass index is 29.36 kg/m².  Physical Exam   Constitutional: He is oriented to person, place, and time. He appears well-developed.   HENT:   Head: Normocephalic.   Right Ear: Tympanic membrane and ear canal normal.   Left Ear: Tympanic membrane and ear canal normal.   Nose: Nose normal.   Mouth/Throat: Oropharynx is clear and moist and mucous membranes are normal. No oral lesions.   Eyes: Pupils are equal, round, and reactive to light. Conjunctivae, EOM and lids are normal. No scleral icterus.   Neck: Trachea normal and normal range of motion. Neck supple. No thyroid mass and no thyromegaly present.   Cardiovascular: Normal rate, regular rhythm and normal heart sounds.   No murmur heard.  Pulmonary/Chest: Effort normal and breath sounds normal.   Abdominal: Soft. Normal appearance. He exhibits no distension, no ascites, no pulsatile midline mass and no mass. There is no " hepatosplenomegaly. There is no tenderness.   Genitourinary: Rectum normal.   Genitourinary Comments: Prostate is soft, 2-3+ enlarged, no nodules   Musculoskeletal:   Grossly normal appearance and ROM, actively patient describes pain in the arc of motion greater than 90 degrees of abduction of the left shoulder, passively this is not the case.  Range of motion actively and passively is full.  The shoulder is nontender to bony palpation or muscular palpation.   Lymphadenopathy:        Head (right side): No submental and no submandibular adenopathy present.        Head (left side): No submental and no submandibular adenopathy present.     He has no cervical adenopathy.   Neurological: He is alert and oriented to person, place, and time. Gait normal.   Skin: Skin is warm and dry. No lesion and no rash noted.   Psychiatric: He has a normal mood and affect. His speech is normal and behavior is normal. Cognition and memory are normal.       Assessment/Plan   Problem List Items Addressed This Visit     Benign essential hypertension    Relevant Medications    olmesartan (BENICAR) 40 mg tablet    Pure hypercholesterolemia    Hyperglycemia    Relevant Orders    Hemoglobin A1c      Other Visit Diagnoses     Routine medical exam    -  Primary    Relevant Orders    CBC    Comprehensive metabolic panel    Lipid panel    Urinalysis with microscopic    PSA    Encounter for screening colonoscopy        Relevant Orders    Direct Access Colonoscopy [facility]    Chronic left shoulder pain        Relevant Orders    Ambulatory referral to Physical Therapy      Continue healthy diet, lifestyle and exercise.  Check blood work today for monitoring.  Switch back to Benicar from [facility] if available at the pharmacy because the patient's blood pressure was a little bit better on this medication.  Refer for colonoscopy and for physical therapy for the left shoulder which I suspect his rotator cuff.  Recommended shingles vaccine, patient reports he  completed his flu shot already.    Patient agrees and verbalizes understanding of medication and treatment plan discussed at today's visit.  Patient was instructed to call or follow-up if symptoms persist or worsen.    No notes on file    Schuyler Butt, DO    This note was created with voice recognition software. Inadvertent dictation errors should be disregarded. Please contact my office for clarification.

## 2019-11-26 LAB
ALBUMIN SERPL-MCNC: 4.4 G/DL (ref 3.6–4.8)
ALBUMIN/GLOB SERPL: 1.8 {RATIO} (ref 1.2–2.2)
ALP SERPL-CCNC: 75 IU/L (ref 39–117)
ALT SERPL-CCNC: 16 IU/L (ref 0–44)
APPEARANCE UR: CLEAR
AST SERPL-CCNC: 18 IU/L (ref 0–40)
BACTERIA #/AREA URNS HPF: NORMAL /[HPF]
BILIRUB SERPL-MCNC: 0.5 MG/DL (ref 0–1.2)
BILIRUB UR QL STRIP: NEGATIVE
BUN SERPL-MCNC: 20 MG/DL (ref 8–27)
BUN/CREAT SERPL: 25 (ref 10–24)
CALCIUM SERPL-MCNC: 9.6 MG/DL (ref 8.6–10.2)
CHLORIDE SERPL-SCNC: 102 MMOL/L (ref 96–106)
CHOLEST SERPL-MCNC: 197 MG/DL (ref 100–199)
CO2 SERPL-SCNC: 24 MMOL/L (ref 20–29)
COLOR UR: YELLOW
CREAT SERPL-MCNC: 0.81 MG/DL (ref 0.76–1.27)
EPI CELLS #/AREA URNS HPF: NORMAL /HPF (ref 0–10)
ERYTHROCYTE [DISTWIDTH] IN BLOOD BY AUTOMATED COUNT: 13.3 % (ref 12.3–15.4)
GLOBULIN SER CALC-MCNC: 2.4 G/DL (ref 1.5–4.5)
GLUCOSE SERPL-MCNC: 112 MG/DL (ref 65–99)
GLUCOSE UR QL: NEGATIVE
HBA1C MFR BLD: 5.7 % (ref 4.8–5.6)
HCT VFR BLD AUTO: 45.6 % (ref 37.5–51)
HDLC SERPL-MCNC: 64 MG/DL
HGB BLD-MCNC: 15.4 G/DL (ref 13–17.7)
HGB UR QL STRIP: NEGATIVE
KETONES UR QL STRIP: NEGATIVE
LAB CORP EGFR IF AFRICN AM: 111 ML/MIN/1.73
LAB CORP EGFR IF NONAFRICN AM: 96 ML/MIN/1.73
LDLC SERPL CALC-MCNC: 121 MG/DL (ref 0–99)
LEUKOCYTE ESTERASE UR QL STRIP: NEGATIVE
MCH RBC QN AUTO: 30.9 PG (ref 26.6–33)
MCHC RBC AUTO-ENTMCNC: 33.8 G/DL (ref 31.5–35.7)
MCV RBC AUTO: 91 FL (ref 79–97)
MICRO URNS: NORMAL
MICRO URNS: NORMAL
NITRITE UR QL STRIP: NEGATIVE
PH UR STRIP: 7 [PH] (ref 5–7.5)
PLATELET # BLD AUTO: 304 X10E3/UL (ref 150–450)
POTASSIUM SERPL-SCNC: 5 MMOL/L (ref 3.5–5.2)
PROT SERPL-MCNC: 6.8 G/DL (ref 6–8.5)
PROT UR QL STRIP: NEGATIVE
PSA SERPL-MCNC: 1.2 NG/ML (ref 0–4)
RBC # BLD AUTO: 4.99 X10E6/UL (ref 4.14–5.8)
RBC #/AREA URNS HPF: NORMAL /HPF (ref 0–2)
SODIUM SERPL-SCNC: 138 MMOL/L (ref 134–144)
SP GR UR: 1.02 (ref 1–1.03)
TRIGL SERPL-MCNC: 60 MG/DL (ref 0–149)
UROBILINOGEN UR STRIP-MCNC: 0.2 MG/DL (ref 0.2–1)
VLDLC SERPL CALC-MCNC: 12 MG/DL (ref 5–40)
WBC # BLD AUTO: 9 X10E3/UL (ref 3.4–10.8)
WBC #/AREA URNS HPF: NORMAL /HPF (ref 0–5)

## 2019-11-26 NOTE — RESULT ENCOUNTER NOTE
Braeden, the blood work is all stable.  The goal for your LDL cholesterol really is 100, you have not quite been able to get there.  Theoretically we could increase your Crestor to 20 mg which would likely get you to goal.  This might also cause side effects with the higher dose.  We could also keep you on the same dose and may be recheck a CT calcium score next year.  If there is been any increase in CT calcium, we would want to increase the Crestor.  But we could also just do that now.  Let me know which you think.

## 2020-01-29 RX ORDER — ROSUVASTATIN CALCIUM 10 MG/1
TABLET, COATED ORAL
Qty: 90 TABLET | Refills: 0 | Status: SHIPPED | OUTPATIENT
Start: 2020-01-29 | End: 2020-04-20

## 2020-01-29 NOTE — TELEPHONE ENCOUNTER
Medicine Refill Request    Last Office Visit: 11/25/2019  Next Office Visit: Visit date not found        Current Outpatient Medications:   •  amLODIPine (NORVASC) 5 mg tablet, TAKE ONE TABLET BY MOUTH ONE TIME DAILY , Disp: 90 tablet, Rfl: 3  •  nystatin-triamcinolone (MYCOLOG II) ointment, , Disp: , Rfl:   •  olmesartan (BENICAR) 40 mg tablet, Take 1 tablet (40 mg total) by mouth daily. Switch back to olmesartan if available, Disp: 90 tablet, Rfl: 1  •  rosuvastatin (CRESTOR) 10 mg tablet, TAKE ONE TABLET BY MOUTH ONE TIME DAILY , Disp: 90 tablet, Rfl: 0  •  telmisartan (MICARDIS) 80 mg tablet, Take 1 tablet (80 mg total) by mouth daily., Disp: 90 tablet, Rfl: 3      BP Readings from Last 3 Encounters:   11/25/19 130/78   03/01/19 126/84   01/25/19 122/84       Recent Lab results:  Lab Results   Component Value Date    CHOL 197 11/25/2019   ,   Lab Results   Component Value Date    HDL 64 11/25/2019   ,   Lab Results   Component Value Date    LDLCALC 121 (H) 11/25/2019   ,   Lab Results   Component Value Date    TRIG 60 11/25/2019        Lab Results   Component Value Date    GLUCOSE 112 (H) 11/25/2019   ,   Lab Results   Component Value Date    HGBA1C 5.7 (H) 11/25/2019         Lab Results   Component Value Date    CREATININE 0.81 11/25/2019       Lab Results   Component Value Date    TSH 1.390 11/05/2015

## 2020-02-26 RX ORDER — SILDENAFIL 100 MG/1
100 TABLET, FILM COATED ORAL DAILY PRN
Qty: 10 TABLET | Refills: 3 | Status: SHIPPED | OUTPATIENT
Start: 2020-02-26 | End: 2021-07-30 | Stop reason: SDUPTHER

## 2020-04-20 RX ORDER — ROSUVASTATIN CALCIUM 10 MG/1
TABLET, COATED ORAL
Qty: 90 TABLET | Refills: 0 | Status: SHIPPED | OUTPATIENT
Start: 2020-04-20 | End: 2020-07-14

## 2020-04-20 NOTE — TELEPHONE ENCOUNTER
Medicine Refill Request    Last Office Visit: 11/25/2019  Next Office Visit: Visit date not found        Current Outpatient Medications:   •  amLODIPine (NORVASC) 5 mg tablet, TAKE ONE TABLET BY MOUTH ONE TIME DAILY , Disp: 90 tablet, Rfl: 3  •  nystatin-triamcinolone (MYCOLOG II) ointment, , Disp: , Rfl:   •  olmesartan (BENICAR) 40 mg tablet, Take 1 tablet (40 mg total) by mouth daily. Switch back to olmesartan if available, Disp: 90 tablet, Rfl: 1  •  rosuvastatin (CRESTOR) 10 mg tablet, TAKE ONE TABLET BY MOUTH ONE TIME DAILY , Disp: 90 tablet, Rfl: 0  •  sildenafiL (VIAGRA) 100 mg tablet, Take 1 tablet (100 mg total) by mouth daily as needed for erectile dysfunction., Disp: 10 tablet, Rfl: 3  •  telmisartan (MICARDIS) 80 mg tablet, Take 1 tablet (80 mg total) by mouth daily., Disp: 90 tablet, Rfl: 3      BP Readings from Last 3 Encounters:   11/25/19 130/78   03/01/19 126/84   01/25/19 122/84       Recent Lab results:  Lab Results   Component Value Date    CHOL 197 11/25/2019   ,   Lab Results   Component Value Date    HDL 64 11/25/2019   ,   Lab Results   Component Value Date    LDLCALC 121 (H) 11/25/2019   ,   Lab Results   Component Value Date    TRIG 60 11/25/2019        Lab Results   Component Value Date    GLUCOSE 112 (H) 11/25/2019   ,   Lab Results   Component Value Date    HGBA1C 5.7 (H) 11/25/2019         Lab Results   Component Value Date    CREATININE 0.81 11/25/2019       Lab Results   Component Value Date    TSH 1.390 11/05/2015

## 2020-05-05 ENCOUNTER — APPOINTMENT (OUTPATIENT)
Dept: URBAN - METROPOLITAN AREA CLINIC 200 | Age: 60
Setting detail: DERMATOLOGY
End: 2020-05-08

## 2020-05-05 DIAGNOSIS — Z85.828 PERSONAL HISTORY OF OTHER MALIGNANT NEOPLASM OF SKIN: ICD-10-CM

## 2020-05-05 DIAGNOSIS — L57.8 OTHER SKIN CHANGES DUE TO CHRONIC EXPOSURE TO NONIONIZING RADIATION: ICD-10-CM

## 2020-05-05 PROBLEM — D23.5 OTHER BENIGN NEOPLASM OF SKIN OF TRUNK: Status: ACTIVE | Noted: 2020-05-05

## 2020-05-05 PROCEDURE — 99213 OFFICE O/P EST LOW 20 MIN: CPT

## 2020-05-05 PROCEDURE — OTHER COUNSELING: OTHER

## 2020-05-05 ASSESSMENT — LOCATION SIMPLE DESCRIPTION DERM
LOCATION SIMPLE: CHEST
LOCATION SIMPLE: ABDOMEN

## 2020-05-05 ASSESSMENT — LOCATION DETAILED DESCRIPTION DERM
LOCATION DETAILED: LEFT MEDIAL SUPERIOR CHEST
LOCATION DETAILED: PERIUMBILICAL SKIN

## 2020-05-05 ASSESSMENT — LOCATION ZONE DERM: LOCATION ZONE: TRUNK

## 2020-07-13 NOTE — TELEPHONE ENCOUNTER
Medicine Refill Request    Last Office Visit: 11/25/2019  Last Telemedicine Visit: Visit date not found    Next Office Visit: Visit date not found  Next Telemedicine Visit: Visit date not found         Current Outpatient Medications:   •  amLODIPine (NORVASC) 5 mg tablet, TAKE ONE TABLET BY MOUTH ONE TIME DAILY , Disp: 90 tablet, Rfl: 3  •  nystatin-triamcinolone (MYCOLOG II) ointment, , Disp: , Rfl:   •  olmesartan (BENICAR) 40 mg tablet, Take 1 tablet (40 mg total) by mouth daily. Switch back to olmesartan if available, Disp: 90 tablet, Rfl: 1  •  rosuvastatin (CRESTOR) 10 mg tablet, TAKE ONE TABLET BY MOUTH ONE TIME DAILY , Disp: 90 tablet, Rfl: 0  •  sildenafiL (VIAGRA) 100 mg tablet, Take 1 tablet (100 mg total) by mouth daily as needed for erectile dysfunction., Disp: 10 tablet, Rfl: 3  •  telmisartan (MICARDIS) 80 mg tablet, Take 1 tablet (80 mg total) by mouth daily., Disp: 90 tablet, Rfl: 3      BP Readings from Last 3 Encounters:   11/25/19 130/78   03/01/19 126/84   01/25/19 122/84       Recent Lab results:  Lab Results   Component Value Date    CHOL 197 11/25/2019   ,   Lab Results   Component Value Date    HDL 64 11/25/2019   ,   Lab Results   Component Value Date    LDLCALC 121 (H) 11/25/2019   ,   Lab Results   Component Value Date    TRIG 60 11/25/2019        Lab Results   Component Value Date    GLUCOSE 112 (H) 11/25/2019   ,   Lab Results   Component Value Date    HGBA1C 5.7 (H) 11/25/2019         Lab Results   Component Value Date    CREATININE 0.81 11/25/2019       Lab Results   Component Value Date    TSH 1.390 11/05/2015

## 2020-07-14 RX ORDER — ROSUVASTATIN CALCIUM 10 MG/1
TABLET, COATED ORAL
Qty: 90 TABLET | Refills: 0 | Status: SHIPPED | OUTPATIENT
Start: 2020-07-14 | End: 2021-01-15

## 2020-12-17 ENCOUNTER — OFFICE VISIT (OUTPATIENT)
Dept: FAMILY MEDICINE | Facility: CLINIC | Age: 60
End: 2020-12-17
Payer: COMMERCIAL

## 2020-12-17 VITALS
TEMPERATURE: 98 F | DIASTOLIC BLOOD PRESSURE: 86 MMHG | HEIGHT: 72 IN | SYSTOLIC BLOOD PRESSURE: 134 MMHG | WEIGHT: 209.2 LBS | BODY MASS INDEX: 28.33 KG/M2 | RESPIRATION RATE: 16 BRPM | HEART RATE: 98 BPM

## 2020-12-17 DIAGNOSIS — Z00.00 ROUTINE MEDICAL EXAM: Primary | ICD-10-CM

## 2020-12-17 DIAGNOSIS — I10 BENIGN ESSENTIAL HYPERTENSION: ICD-10-CM

## 2020-12-17 DIAGNOSIS — E78.00 PURE HYPERCHOLESTEROLEMIA: ICD-10-CM

## 2020-12-17 DIAGNOSIS — R73.9 HYPERGLYCEMIA: ICD-10-CM

## 2020-12-17 DIAGNOSIS — Z12.5 SCREENING FOR PROSTATE CANCER: ICD-10-CM

## 2020-12-17 PROCEDURE — 36415 COLL VENOUS BLD VENIPUNCTURE: CPT | Performed by: FAMILY MEDICINE

## 2020-12-17 PROCEDURE — 99396 PREV VISIT EST AGE 40-64: CPT | Performed by: FAMILY MEDICINE

## 2020-12-17 RX ORDER — NAPROXEN SODIUM 220 MG/1
81 TABLET, FILM COATED ORAL DAILY
COMMUNITY

## 2020-12-17 NOTE — PROGRESS NOTES
"Subjective      Patient ID: Braeden Paige is a 60 y.o. male.    HPI patient is here for routine exam, he has a history of hypertension, hyperglycemia and high cholesterol.  He feels well and has no specific concerns.  He did decrease alcohol consumption, is exercising fairly regularly.    Tobacco  Allergies  Meds  Problems  Med Hx  Surg Hx  Fam Hx       Review of Systems   All other systems reviewed and are negative.    Allergies   Allergen Reactions   • Ace Inhibitors      Other reaction(s): cough     Current Outpatient Medications   Medication Sig Dispense Refill   • amLODIPine (NORVASC) 5 mg tablet TAKE ONE TABLET BY MOUTH ONE TIME DAILY  90 tablet 3   • aspirin 81 mg chewable tablet Take 81 mg by mouth daily.     • nystatin-triamcinolone (MYCOLOG II) ointment      • olmesartan (BENICAR) 40 mg tablet Take 1 tablet (40 mg total) by mouth daily. Switch back to olmesartan if available 90 tablet 1   • rosuvastatin (CRESTOR) 10 mg tablet TAKE ONE TABLET BY MOUTH ONE TIME DAILY  90 tablet 0   • sildenafiL (VIAGRA) 100 mg tablet Take 1 tablet (100 mg total) by mouth daily as needed for erectile dysfunction. 10 tablet 3   • telmisartan (MICARDIS) 80 mg tablet Take 1 tablet (80 mg total) by mouth daily. 90 tablet 3     No current facility-administered medications for this visit.      Past Medical History:   Diagnosis Date   • Hypercholesteremia    • Hypertension      Past Surgical History:   Procedure Laterality Date   • TONSILLECTOMY       Social History     Tobacco Use   • Smoking status: Current Some Day Smoker     Types: Cigars   • Smokeless tobacco: Never Used   Substance Use Topics   • Alcohol use: Yes     Comment: occationally   • Drug use: No     History reviewed. No pertinent family history.    Objective   Vitals:    12/17/20 1450 12/17/20 1503   BP: (!) 146/86 134/86   Pulse: 98    Resp: 16    Temp: 36.7 °C (98 °F)    Weight: 94.9 kg (209 lb 3.2 oz)    Height: 1.822 m (5' 11.75\")     Body mass index is " 28.57 kg/m².  Physical Exam  Constitutional:       Appearance: Normal appearance. He is well-developed.   HENT:      Head: Normocephalic.      Right Ear: Tympanic membrane and ear canal normal.      Left Ear: Tympanic membrane and ear canal normal.   Eyes:      General: Lids are normal. No scleral icterus.     Conjunctiva/sclera: Conjunctivae normal.      Pupils: Pupils are equal, round, and reactive to light.   Neck:      Musculoskeletal: Normal range of motion and neck supple.      Thyroid: No thyroid mass or thyromegaly.      Trachea: Trachea normal.   Cardiovascular:      Rate and Rhythm: Normal rate and regular rhythm.      Heart sounds: Normal heart sounds. No murmur.   Pulmonary:      Effort: Pulmonary effort is normal.      Breath sounds: Normal breath sounds.   Abdominal:      General: There is no distension.      Palpations: Abdomen is soft. There is no mass or pulsatile mass.      Tenderness: There is no abdominal tenderness.   Genitourinary:     Rectum: Normal.      Comments: Prostate 1+ enlarged, smooth, no nodules  Musculoskeletal:      Comments: Grossly normal appearance and ROM   Lymphadenopathy:      Head:      Right side of head: No submental or submandibular adenopathy.      Left side of head: No submental or submandibular adenopathy.      Cervical: No cervical adenopathy.   Skin:     General: Skin is warm and dry.      Findings: No lesion or rash.   Neurological:      Mental Status: He is alert and oriented to person, place, and time.      Gait: Gait normal.   Psychiatric:         Speech: Speech normal.         Behavior: Behavior normal.         Assessment/Plan   Problem List Items Addressed This Visit     Benign essential hypertension    Pure hypercholesterolemia    Hyperglycemia    Relevant Orders    Hemoglobin A1c      Other Visit Diagnoses     Routine medical exam    -  Primary    Relevant Orders    CBC    Comprehensive metabolic panel    Lipid panel    Urinalysis with microscopic     Screening for prostate cancer        Relevant Orders    PSA      Check blood work today for routine screening and monitoring.  Recommended lower calorie diet for weight loss purposes and improvement in blood sugar.  30 minutes of physical activity 5 times weekly.  Continue current medications.    Patient agrees and verbalizes understanding of medication and treatment plan discussed at today's visit.  Patient was instructed to call or follow-up if symptoms persist or worsen.    No notes on file    Schuyler Butt, DO    This note was created with voice recognition software. Inadvertent dictation errors should be disregarded. Please contact my office for clarification.

## 2020-12-18 LAB
ALBUMIN SERPL-MCNC: 4.6 G/DL (ref 3.8–4.8)
ALBUMIN/GLOB SERPL: 1.7 {RATIO} (ref 1.2–2.2)
ALP SERPL-CCNC: 71 IU/L (ref 39–117)
ALT SERPL-CCNC: 23 IU/L (ref 0–44)
APPEARANCE UR: CLEAR
AST SERPL-CCNC: 28 IU/L (ref 0–40)
BACTERIA #/AREA URNS HPF: NORMAL /[HPF]
BILIRUB SERPL-MCNC: 0.5 MG/DL (ref 0–1.2)
BILIRUB UR QL STRIP: NEGATIVE
BUN SERPL-MCNC: 20 MG/DL (ref 8–27)
BUN/CREAT SERPL: 22 (ref 10–24)
CALCIUM SERPL-MCNC: 9.4 MG/DL (ref 8.6–10.2)
CHLORIDE SERPL-SCNC: 103 MMOL/L (ref 96–106)
CHOLEST SERPL-MCNC: 218 MG/DL (ref 100–199)
CO2 SERPL-SCNC: 20 MMOL/L (ref 20–29)
COLOR UR: YELLOW
CREAT SERPL-MCNC: 0.93 MG/DL (ref 0.76–1.27)
EPI CELLS #/AREA URNS HPF: NORMAL /HPF (ref 0–10)
ERYTHROCYTE [DISTWIDTH] IN BLOOD BY AUTOMATED COUNT: 12.5 % (ref 11.6–15.4)
GLOBULIN SER CALC-MCNC: 2.7 G/DL (ref 1.5–4.5)
GLUCOSE SERPL-MCNC: 93 MG/DL (ref 65–99)
GLUCOSE UR QL: NEGATIVE
HBA1C MFR BLD: 5.7 % (ref 4.8–5.6)
HCT VFR BLD AUTO: 45.4 % (ref 37.5–51)
HDLC SERPL-MCNC: 65 MG/DL
HGB BLD-MCNC: 15.3 G/DL (ref 13–17.7)
HGB UR QL STRIP: NEGATIVE
KETONES UR QL STRIP: ABNORMAL
LAB CORP EGFR IF AFRICN AM: 101 ML/MIN/1.73
LAB CORP EGFR IF NONAFRICN AM: 88 ML/MIN/1.73
LDLC SERPL CALC-MCNC: 144 MG/DL (ref 0–99)
LEUKOCYTE ESTERASE UR QL STRIP: NEGATIVE
MCH RBC QN AUTO: 30.7 PG (ref 26.6–33)
MCHC RBC AUTO-ENTMCNC: 33.7 G/DL (ref 31.5–35.7)
MCV RBC AUTO: 91 FL (ref 79–97)
MICRO URNS: ABNORMAL
MICRO URNS: ABNORMAL
NITRITE UR QL STRIP: NEGATIVE
PH UR STRIP: 5.5 [PH] (ref 5–7.5)
PLATELET # BLD AUTO: 293 X10E3/UL (ref 150–450)
POTASSIUM SERPL-SCNC: 4.2 MMOL/L (ref 3.5–5.2)
PROT SERPL-MCNC: 7.3 G/DL (ref 6–8.5)
PROT UR QL STRIP: NEGATIVE
PSA SERPL-MCNC: 1.5 NG/ML (ref 0–4)
RBC # BLD AUTO: 4.98 X10E6/UL (ref 4.14–5.8)
RBC #/AREA URNS HPF: NORMAL /HPF (ref 0–2)
SODIUM SERPL-SCNC: 137 MMOL/L (ref 134–144)
SP GR UR: 1.01 (ref 1–1.03)
TRIGL SERPL-MCNC: 54 MG/DL (ref 0–149)
UROBILINOGEN UR STRIP-MCNC: 0.2 MG/DL (ref 0.2–1)
VLDLC SERPL CALC-MCNC: 9 MG/DL (ref 5–40)
WBC # BLD AUTO: 13.1 X10E3/UL (ref 3.4–10.8)
WBC #/AREA URNS HPF: NORMAL /HPF (ref 0–5)

## 2020-12-21 NOTE — RESULT ENCOUNTER NOTE
Braeden, your long-term blood sugar test, hemoglobin A1c was stable at 5.7.  Your white blood cell count was just slightly elevated at 13.1, I do not think this is clinically significant but we should recheck this in 2 to 3 months.  Your cholesterol numbers were higher this time, if there are some things you think you can change in your diet that would be good, if not I would want to increase your Crestor a little.  The rest of your blood work is normal.

## 2021-05-28 ENCOUNTER — APPOINTMENT (OUTPATIENT)
Dept: URBAN - METROPOLITAN AREA CLINIC 200 | Age: 61
Setting detail: DERMATOLOGY
End: 2021-05-30

## 2021-05-28 DIAGNOSIS — K13.0 DISEASES OF LIPS: ICD-10-CM

## 2021-05-28 DIAGNOSIS — L82.1 OTHER SEBORRHEIC KERATOSIS: ICD-10-CM

## 2021-05-28 DIAGNOSIS — D18.0 HEMANGIOMA: ICD-10-CM

## 2021-05-28 DIAGNOSIS — L57.8 OTHER SKIN CHANGES DUE TO CHRONIC EXPOSURE TO NONIONIZING RADIATION: ICD-10-CM

## 2021-05-28 PROBLEM — D18.01 HEMANGIOMA OF SKIN AND SUBCUTANEOUS TISSUE: Status: ACTIVE | Noted: 2021-05-28

## 2021-05-28 PROCEDURE — OTHER REASSURANCE: OTHER

## 2021-05-28 PROCEDURE — 99213 OFFICE O/P EST LOW 20 MIN: CPT

## 2021-05-28 PROCEDURE — OTHER PRESCRIPTION MEDICATION MANAGEMENT: OTHER

## 2021-05-28 PROCEDURE — OTHER COUNSELING: OTHER

## 2021-05-28 ASSESSMENT — LOCATION SIMPLE DESCRIPTION DERM
LOCATION SIMPLE: CHEST
LOCATION SIMPLE: LEFT LIP
LOCATION SIMPLE: RIGHT LOWER LIP
LOCATION SIMPLE: LEFT CHEEK
LOCATION SIMPLE: RIGHT FOREARM
LOCATION SIMPLE: LEFT UPPER BACK
LOCATION SIMPLE: RIGHT UPPER BACK

## 2021-05-28 ASSESSMENT — LOCATION ZONE DERM
LOCATION ZONE: TRUNK
LOCATION ZONE: ARM
LOCATION ZONE: LIP
LOCATION ZONE: FACE

## 2021-05-28 ASSESSMENT — LOCATION DETAILED DESCRIPTION DERM
LOCATION DETAILED: RIGHT LATERAL UPPER BACK
LOCATION DETAILED: LEFT SUPERIOR UPPER BACK
LOCATION DETAILED: RIGHT INFERIOR VERMILION BORDER
LOCATION DETAILED: RIGHT PROXIMAL DORSAL FOREARM
LOCATION DETAILED: LEFT MEDIAL SUPERIOR CHEST
LOCATION DETAILED: RIGHT DISTAL DORSAL FOREARM
LOCATION DETAILED: LEFT MEDIAL MALAR CHEEK
LOCATION DETAILED: LEFT LOWER CUTANEOUS LIP

## 2021-05-28 NOTE — PROCEDURE: PRESCRIPTION MEDICATION MANAGEMENT
Continue Regimen: Nystatin triamcinolone bid prn
Detail Level: Detailed
Render In Strict Bullet Format?: No

## 2021-05-28 NOTE — PROCEDURE: REASSURANCE
Hide Include Location In Plan Question?: No
Include Location In Plan?: Yes
Detail Level: Detailed
Additional Note: Cryo

## 2021-07-22 NOTE — PROGRESS NOTES
Subjective      Patient ID: Braeden Paige is a 60 y.o. male.    Was lifting weights a few months ago and got pain in medial elbow. Continued and then rested for 3 weeks but still pain. Mostly in elbow, minimal swelling and no radiation. Right handed.      Tobacco  Allergies  Meds  Problems  Med Hx  Surg Hx  Fam Hx       Review of Systems   Constitutional: Positive for activity change. Negative for chills, fatigue and fever.   Musculoskeletal: Positive for arthralgias.   Neurological: Negative for weakness and numbness.     Allergies   Allergen Reactions   • Ace Inhibitors      Other reaction(s): cough     Current Outpatient Medications   Medication Sig Dispense Refill   • amLODIPine (NORVASC) 5 mg tablet TAKE ONE TABLET BY MOUTH ONE TIME DAILY  90 tablet 3   • aspirin 81 mg chewable tablet Take 81 mg by mouth daily.     • nystatin-triamcinolone (MYCOLOG II) ointment      • olmesartan (BENICAR) 40 mg tablet Take 1 tablet (40 mg total) by mouth daily. 90 tablet 3   • rosuvastatin (CRESTOR) 10 mg tablet TAKE ONE TABLET BY MOUTH ONE TIME DAILY  90 tablet 0   • sildenafiL (VIAGRA) 100 mg tablet Take 1 tablet (100 mg total) by mouth daily as needed for erectile dysfunction. 10 tablet 3   • telmisartan (MICARDIS) 80 mg tablet Take 1 tablet (80 mg total) by mouth daily. 90 tablet 3     No current facility-administered medications for this visit.     Past Medical History:   Diagnosis Date   • Hypercholesteremia    • Hypertension      Past Surgical History:   Procedure Laterality Date   • TONSILLECTOMY       Social History     Tobacco Use   • Smoking status: Current Some Day Smoker     Types: Cigars   • Smokeless tobacco: Never Used   Substance Use Topics   • Alcohol use: Yes     Comment: occationally   • Drug use: No     History reviewed. No pertinent family history.    Objective   Vitals:    07/23/21 1310   BP: 122/80   Pulse: 84   Resp: 16   Temp: 36.6 °C (97.9 °F)   Weight: 95.9 kg (211 lb 6.4 oz)   Height:  "1.822 m (5' 11.75\")    Body mass index is 28.87 kg/m².  Physical Exam  Constitutional:       General: He is not in acute distress.     Appearance: Normal appearance.   Musculoskeletal:      Left elbow: Tenderness present.        Arms:       Comments: Tenderness along right inner elbow along medial epicondyle   Neurological:      Mental Status: He is alert.         Assessment/Plan   Problem List Items Addressed This Visit     None      Visit Diagnoses     Medial epicondylitis of elbow, right    -  Primary    Relevant Orders    Ambulatory referral to Orthopedic Surgery      Patient with persistent tenderness despite rest.  Recommend orthopedic evaluation as patient may need injection or physical therapy for site.  This is his dominant hand.    Patient agrees and verbalizes understanding of medication and treatment plan discussed at today's visit.  Patient was instructed to call or follow-up if symptoms persist or worsen.    No notes on file    Pretty Harper,     This note was created with voice recognition software. Inadvertent dictation errors should be disregarded. Please contact my office for clarification.  "

## 2021-07-23 ENCOUNTER — OFFICE VISIT (OUTPATIENT)
Dept: FAMILY MEDICINE | Facility: CLINIC | Age: 61
End: 2021-07-23
Payer: COMMERCIAL

## 2021-07-23 VITALS
SYSTOLIC BLOOD PRESSURE: 122 MMHG | TEMPERATURE: 97.9 F | DIASTOLIC BLOOD PRESSURE: 80 MMHG | WEIGHT: 211.4 LBS | HEART RATE: 84 BPM | BODY MASS INDEX: 28.63 KG/M2 | RESPIRATION RATE: 16 BRPM | HEIGHT: 72 IN

## 2021-07-23 DIAGNOSIS — M77.01 MEDIAL EPICONDYLITIS OF ELBOW, RIGHT: Primary | ICD-10-CM

## 2021-07-23 PROCEDURE — 3008F BODY MASS INDEX DOCD: CPT | Performed by: FAMILY MEDICINE

## 2021-07-23 PROCEDURE — 99212 OFFICE O/P EST SF 10 MIN: CPT | Performed by: FAMILY MEDICINE

## 2021-07-23 PROCEDURE — 3074F SYST BP LT 130 MM HG: CPT | Performed by: FAMILY MEDICINE

## 2021-07-23 PROCEDURE — 3079F DIAST BP 80-89 MM HG: CPT | Performed by: FAMILY MEDICINE

## 2021-07-23 ASSESSMENT — ENCOUNTER SYMPTOMS
FATIGUE: 0
NUMBNESS: 0
ARTHRALGIAS: 1
FEVER: 0
WEAKNESS: 0
ACTIVITY CHANGE: 1
CHILLS: 0

## 2021-07-30 RX ORDER — SILDENAFIL 100 MG/1
100 TABLET, FILM COATED ORAL DAILY PRN
Qty: 10 TABLET | Refills: 3 | Status: SHIPPED | OUTPATIENT
Start: 2021-07-30

## 2021-07-30 RX ORDER — OLMESARTAN MEDOXOMIL 40 MG/1
40 TABLET ORAL DAILY
Qty: 90 TABLET | Refills: 3 | Status: SHIPPED | OUTPATIENT
Start: 2021-07-30 | End: 2022-09-12

## 2021-07-30 NOTE — TELEPHONE ENCOUNTER
Medicine Refill Request    Last Office Visit: 7/23/2021  Last Telemedicine Visit: Visit date not found    Next Office Visit: Visit date not found  Next Telemedicine Visit: Visit date not found         Current Outpatient Medications:   •  amLODIPine (NORVASC) 5 mg tablet, TAKE ONE TABLET BY MOUTH ONE TIME DAILY , Disp: 90 tablet, Rfl: 3  •  aspirin 81 mg chewable tablet, Take 81 mg by mouth daily., Disp: , Rfl:   •  nystatin-triamcinolone (MYCOLOG II) ointment, , Disp: , Rfl:   •  olmesartan (BENICAR) 40 mg tablet, Take 1 tablet (40 mg total) by mouth daily., Disp: 90 tablet, Rfl: 3  •  rosuvastatin (CRESTOR) 10 mg tablet, TAKE ONE TABLET BY MOUTH ONE TIME DAILY , Disp: 90 tablet, Rfl: 0  •  sildenafiL (VIAGRA) 100 mg tablet, Take 1 tablet (100 mg total) by mouth daily as needed for erectile dysfunction., Disp: 10 tablet, Rfl: 3  •  telmisartan (MICARDIS) 80 mg tablet, Take 1 tablet (80 mg total) by mouth daily., Disp: 90 tablet, Rfl: 3      BP Readings from Last 3 Encounters:   07/23/21 122/80   12/17/20 134/86   11/25/19 130/78       Recent Lab results:  Lab Results   Component Value Date    CHOL 218 (H) 12/17/2020   ,   Lab Results   Component Value Date    HDL 65 12/17/2020   ,   Lab Results   Component Value Date    LDLCALC 144 (H) 12/17/2020   ,   Lab Results   Component Value Date    TRIG 54 12/17/2020        Lab Results   Component Value Date    GLUCOSE 93 12/17/2020   ,   Lab Results   Component Value Date    HGBA1C 5.7 (H) 12/17/2020         Lab Results   Component Value Date    CREATININE 0.93 12/17/2020       Lab Results   Component Value Date    TSH 1.390 11/05/2015

## 2021-08-03 ENCOUNTER — TELEPHONE (OUTPATIENT)
Dept: FAMILY MEDICINE | Facility: CLINIC | Age: 61
End: 2021-08-03

## 2021-10-11 ENCOUNTER — APPOINTMENT (OUTPATIENT)
Dept: URBAN - METROPOLITAN AREA CLINIC 200 | Age: 61
Setting detail: DERMATOLOGY
End: 2021-10-12

## 2021-10-11 DIAGNOSIS — L82.1 OTHER SEBORRHEIC KERATOSIS: ICD-10-CM

## 2021-10-11 PROBLEM — I10 ESSENTIAL (PRIMARY) HYPERTENSION: Status: ACTIVE | Noted: 2021-10-11

## 2021-10-11 PROCEDURE — OTHER LIQUID NITROGEN (COSMETIC): OTHER

## 2021-10-11 ASSESSMENT — LOCATION DETAILED DESCRIPTION DERM
LOCATION DETAILED: LEFT MEDIAL UPPER BACK
LOCATION DETAILED: INFERIOR THORACIC SPINE
LOCATION DETAILED: RIGHT SUPERIOR MEDIAL UPPER BACK
LOCATION DETAILED: LEFT INFERIOR UPPER BACK
LOCATION DETAILED: LEFT SUPERIOR UPPER BACK
LOCATION DETAILED: LEFT MID-UPPER BACK
LOCATION DETAILED: SUPERIOR LUMBAR SPINE
LOCATION DETAILED: RIGHT DISTAL ULNAR DORSAL FOREARM
LOCATION DETAILED: RIGHT INFERIOR UPPER BACK
LOCATION DETAILED: RIGHT SUPERIOR LATERAL UPPER BACK
LOCATION DETAILED: LEFT LATERAL UPPER BACK
LOCATION DETAILED: LEFT SUPERIOR LATERAL MIDBACK
LOCATION DETAILED: LEFT SUPERIOR MEDIAL MIDBACK
LOCATION DETAILED: SUPERIOR THORACIC SPINE

## 2021-10-11 ASSESSMENT — LOCATION SIMPLE DESCRIPTION DERM
LOCATION SIMPLE: RIGHT FOREARM
LOCATION SIMPLE: LEFT UPPER BACK
LOCATION SIMPLE: RIGHT UPPER BACK
LOCATION SIMPLE: LOWER BACK
LOCATION SIMPLE: UPPER BACK
LOCATION SIMPLE: LEFT LOWER BACK

## 2021-10-11 ASSESSMENT — LOCATION ZONE DERM
LOCATION ZONE: ARM
LOCATION ZONE: TRUNK

## 2021-10-11 NOTE — PROCEDURE: LIQUID NITROGEN (COSMETIC)
Render Post-Care Instructions In Note?: no
Post-Care Instructions: I reviewed with the patient in detail post-care instructions. Patient is to wear sunprotection, and avoid picking at any of the treated lesions. Pt may apply Vaseline to crusted or scabbing areas.
Detail Level: Generalized
Billing Information: Bill by Static Price
Consent: The patient's consent was obtained including but not limited to risks of crusting, scabbing, blistering, scarring, darker or lighter pigmentary change, recurrence, incomplete removal and infection. The patient understands that the procedure is cosmetic in nature and is not covered by insurance.
Price (Use Numbers Only, No Special Characters Or $): 190

## 2021-11-12 ENCOUNTER — TELEMEDICINE (OUTPATIENT)
Dept: FAMILY MEDICINE | Facility: CLINIC | Age: 61
End: 2021-11-12
Payer: COMMERCIAL

## 2021-11-12 DIAGNOSIS — R05.9 COUGH: ICD-10-CM

## 2021-11-12 DIAGNOSIS — J01.90 ACUTE NON-RECURRENT SINUSITIS, UNSPECIFIED LOCATION: Primary | ICD-10-CM

## 2021-11-12 PROCEDURE — 99213 OFFICE O/P EST LOW 20 MIN: CPT | Mod: 95 | Performed by: FAMILY MEDICINE

## 2021-11-12 RX ORDER — AZITHROMYCIN 250 MG/1
TABLET, FILM COATED ORAL
Qty: 6 TABLET | Refills: 0 | Status: SHIPPED | OUTPATIENT
Start: 2021-11-12 | End: 2022-06-01 | Stop reason: ALTCHOICE

## 2021-11-12 NOTE — PROGRESS NOTES
Verification of Patient Location:  The patient affirms they are currently located in the following state: [place]    Request for Consent:    Audio and Video Encounter   Aneta, my name is Schuyler MCDERMOTT DO Daquan.  Before we proceed, can you please verify your identification by telling me your full name and date of birth?  Can you tell me who is in the room with you?    You and I are about to have a telemedicine check-in or visit because you have requested it.  This is a live video-conference.  I am a real person, speaking to you in real time.  There is no one else with me on the video-conference.  However, when we use ([employer], [employer], etc) it is important for you to know that the video-conference may not be secure or private.  I am not recording this conversation and I am asking you not to record it.  This telemedicine visit will be billed to your health insurance or you, if you are self-insured.  You understand you will be responsible for any copayments or coinsurances that apply to your telemedicine visit.  Communication platform used for this encounter:  [email or web address]     Before starting our telemedicine visit, I am required to get your consent for this virtual check-in or visit by telemedicine. Do you consent?      Patient Response to Request for Consent:  Yes      Visit Documentation:  Subjective     Patient ID: Braeden Paige is a 61 y.o. male.  1960      HPI patient reports he started about 5 days ago with sore throat runny nose nasal congestion lots of sinus pressure.  Is a cough productive of green mucus and he continues to run a low-grade fever he reports.  He has a history of recurrent sinus infections.He does not feel short of breath.    The following have been reviewed and updated as appropriate in this visit:  Tobacco  Allergies  Meds  Problems  Med Hx  Surg Hx  Fam Hx       Review of Systems      Assessment/Plan   Diagnoses and all orders for this visit:    Acute non-recurrent sinusitis,  unspecified location (Primary)    Cough    Other orders  -     azithromycin 250 mg tablet; Take 2 tablets the first day, then 1 tablet daily for 4 days.     cover for pneumonia and sinusitis with Zithromax.  Patient was instructed to call or followup if symptoms persist or worsen. The patient agrees and verbalizes understanding of medication and treatment plan discussed at today's visit. Side effects were discussed.  Time Spent:  I spent 5 minutes on this date of service performing the following activities: coordinating care.

## 2021-12-02 ENCOUNTER — TELEPHONE (OUTPATIENT)
Dept: FAMILY MEDICINE | Facility: CLINIC | Age: 61
End: 2021-12-02

## 2021-12-02 RX ORDER — CEFUROXIME AXETIL 500 MG/1
500 TABLET ORAL 2 TIMES DAILY
Qty: 20 TABLET | Refills: 0 | Status: SHIPPED | OUTPATIENT
Start: 2021-12-02 | End: 2021-12-12

## 2021-12-02 NOTE — TELEPHONE ENCOUNTER
Patient called finished his antibiotic and still sick he was checked for covid and it was negative. Was hoping you could call in another round of antibiotic

## 2022-03-23 ENCOUNTER — APPOINTMENT (OUTPATIENT)
Dept: URBAN - METROPOLITAN AREA CLINIC 200 | Age: 62
Setting detail: DERMATOLOGY
End: 2022-04-03

## 2022-03-23 DIAGNOSIS — Z11.52 ENCOUNTER FOR SCREENING FOR COVID-19: ICD-10-CM

## 2022-03-23 DIAGNOSIS — L82.1 OTHER SEBORRHEIC KERATOSIS: ICD-10-CM

## 2022-03-23 PROCEDURE — OTHER LIQUID NITROGEN (COSMETIC): OTHER

## 2022-03-23 PROCEDURE — OTHER MIPS QUALITY: OTHER

## 2022-03-23 PROCEDURE — OTHER SCREENING FOR COVID-19: OTHER

## 2022-03-23 ASSESSMENT — LOCATION DETAILED DESCRIPTION DERM
LOCATION DETAILED: RIGHT INFERIOR UPPER BACK
LOCATION DETAILED: INFERIOR THORACIC SPINE
LOCATION DETAILED: LEFT SUPERIOR MEDIAL UPPER BACK
LOCATION DETAILED: LEFT SUPERIOR LATERAL MIDBACK
LOCATION DETAILED: RIGHT SUPERIOR MEDIAL UPPER BACK
LOCATION DETAILED: RIGHT DISTAL ULNAR DORSAL FOREARM
LOCATION DETAILED: LEFT POSTERIOR SHOULDER
LOCATION DETAILED: RIGHT SUPERIOR UPPER BACK
LOCATION DETAILED: SUPERIOR LUMBAR SPINE
LOCATION DETAILED: LEFT LATERAL UPPER BACK
LOCATION DETAILED: LEFT SUPERIOR UPPER BACK
LOCATION DETAILED: LEFT INFERIOR UPPER BACK
LOCATION DETAILED: RIGHT SUPERIOR LATERAL UPPER BACK
LOCATION DETAILED: LEFT MID-UPPER BACK
LOCATION DETAILED: LEFT SUPERIOR MEDIAL MIDBACK
LOCATION DETAILED: SUPERIOR THORACIC SPINE
LOCATION DETAILED: PERIUMBILICAL SKIN
LOCATION DETAILED: LEFT MEDIAL UPPER BACK

## 2022-03-23 ASSESSMENT — LOCATION SIMPLE DESCRIPTION DERM
LOCATION SIMPLE: LEFT SHOULDER
LOCATION SIMPLE: UPPER BACK
LOCATION SIMPLE: RIGHT UPPER BACK
LOCATION SIMPLE: LOWER BACK
LOCATION SIMPLE: LEFT LOWER BACK
LOCATION SIMPLE: ABDOMEN
LOCATION SIMPLE: LEFT UPPER BACK
LOCATION SIMPLE: RIGHT FOREARM

## 2022-03-23 ASSESSMENT — LOCATION ZONE DERM
LOCATION ZONE: ARM
LOCATION ZONE: TRUNK

## 2022-03-23 NOTE — PROCEDURE: LIQUID NITROGEN (COSMETIC)
Billing Information: Bill by Static Price
Detail Level: Generalized
Consent: The patient's consent was obtained including but not limited to risks of crusting, scabbing, blistering, scarring, darker or lighter pigmentary change, recurrence, incomplete removal and infection. The patient understands that the procedure is cosmetic in nature and is not covered by insurance.
Price (Use Numbers Only, No Special Characters Or $): 190
Render Post-Care Instructions In Note?: no
Spray Paint Text: The liquid nitrogen was applied to the skin utilizing a spray paint frosting technique.
Post-Care Instructions: I reviewed with the patient in detail post-care instructions. Patient is to wear sunprotection, and avoid picking at any of the treated lesions. Pt may apply Vaseline to crusted or scabbing areas.

## 2022-04-22 RX ORDER — AMLODIPINE BESYLATE 5 MG/1
TABLET ORAL
Qty: 90 TABLET | Refills: 0 | Status: SHIPPED | OUTPATIENT
Start: 2022-04-22 | End: 2022-09-19

## 2022-04-22 NOTE — TELEPHONE ENCOUNTER
Medicine Refill Request    Last Office: 7/23/2021   Last Consult Visit: Visit date not found  Last Telemedicine Visit: 11/12/2021 Schuyler Butt, DO    Next Appointment: Visit date not found      Current Outpatient Medications:   •  amLODIPine (NORVASC) 5 mg tablet, TAKE ONE TABLET BY MOUTH ONE TIME DAILY , Disp: 90 tablet, Rfl: 3  •  aspirin 81 mg chewable tablet, Take 81 mg by mouth daily., Disp: , Rfl:   •  azithromycin 250 mg tablet, Take 2 tablets the first day, then 1 tablet daily for 4 days., Disp: 6 tablet, Rfl: 0  •  nystatin-triamcinolone (MYCOLOG II) ointment, , Disp: , Rfl:   •  olmesartan (BENICAR) 40 mg tablet, Take 1 tablet (40 mg total) by mouth daily., Disp: 90 tablet, Rfl: 3  •  rosuvastatin (CRESTOR) 10 mg tablet, TAKE ONE TABLET BY MOUTH ONE TIME DAILY , Disp: 90 tablet, Rfl: 1  •  sildenafiL (VIAGRA) 100 mg tablet, Take 1 tablet (100 mg total) by mouth daily as needed for erectile dysfunction., Disp: 10 tablet, Rfl: 3  •  telmisartan (MICARDIS) 80 mg tablet, Take 1 tablet (80 mg total) by mouth daily., Disp: 90 tablet, Rfl: 3      BP Readings from Last 3 Encounters:   07/23/21 122/80   12/17/20 134/86   11/25/19 130/78       Recent Lab results:  Lab Results   Component Value Date    CHOL 218 (H) 12/17/2020   ,   Lab Results   Component Value Date    HDL 65 12/17/2020   ,   Lab Results   Component Value Date    LDLCALC 144 (H) 12/17/2020   ,   Lab Results   Component Value Date    TRIG 54 12/17/2020        Lab Results   Component Value Date    GLUCOSE 93 12/17/2020   ,   Lab Results   Component Value Date    HGBA1C 5.7 (H) 12/17/2020         Lab Results   Component Value Date    CREATININE 0.93 12/17/2020       Lab Results   Component Value Date    TSH 1.390 11/05/2015

## 2022-05-02 ENCOUNTER — OFFICE VISIT (OUTPATIENT)
Dept: FAMILY MEDICINE | Facility: CLINIC | Age: 62
End: 2022-05-02
Payer: COMMERCIAL

## 2022-05-02 VITALS
DIASTOLIC BLOOD PRESSURE: 94 MMHG | RESPIRATION RATE: 16 BRPM | HEART RATE: 68 BPM | SYSTOLIC BLOOD PRESSURE: 162 MMHG | WEIGHT: 211.8 LBS | BODY MASS INDEX: 28.69 KG/M2 | HEIGHT: 72 IN | TEMPERATURE: 97.5 F

## 2022-05-02 DIAGNOSIS — R73.9 HYPERGLYCEMIA: ICD-10-CM

## 2022-05-02 DIAGNOSIS — M54.50 LEFT-SIDED LOW BACK PAIN WITHOUT SCIATICA, UNSPECIFIED CHRONICITY: Primary | ICD-10-CM

## 2022-05-02 DIAGNOSIS — I10 BENIGN ESSENTIAL HYPERTENSION: ICD-10-CM

## 2022-05-02 DIAGNOSIS — Z00.00 HEALTHCARE MAINTENANCE: ICD-10-CM

## 2022-05-02 DIAGNOSIS — Z12.5 SCREENING FOR PROSTATE CANCER: ICD-10-CM

## 2022-05-02 PROCEDURE — 3080F DIAST BP >= 90 MM HG: CPT | Performed by: FAMILY MEDICINE

## 2022-05-02 PROCEDURE — 99214 OFFICE O/P EST MOD 30 MIN: CPT | Performed by: FAMILY MEDICINE

## 2022-05-02 PROCEDURE — 3008F BODY MASS INDEX DOCD: CPT | Performed by: FAMILY MEDICINE

## 2022-05-02 PROCEDURE — 3077F SYST BP >= 140 MM HG: CPT | Performed by: FAMILY MEDICINE

## 2022-05-02 PROCEDURE — 36415 COLL VENOUS BLD VENIPUNCTURE: CPT | Performed by: FAMILY MEDICINE

## 2022-05-02 NOTE — PROGRESS NOTES
Subjective      Patient ID: Braeden Paige is a 61 y.o. male.    HPI patient with a history of hypertension hyperglycemia and high cholesterol comes in today for routine blood work for monitoring of chronic conditions as well as for left-sided low back pain.  He reports back pains been present for about 2 to 3 months, there is a constant low level of discomfort at all times, with exacerbations at times.  Patient cannot really identify a specific exacerbating or alleviating factors.  There was no injury.  Patient has radiation of discomfort just into the left buttock but not past the knee or into the foot and no numbness or tingling.     Tobacco  Allergies  Meds  Problems  Med Hx  Surg Hx  Fam Hx         Review of Systems  Allergies   Allergen Reactions   • Ace Inhibitors      Other reaction(s): cough     Current Outpatient Medications   Medication Sig Dispense Refill   • amLODIPine (NORVASC) 5 mg tablet Take one tablet by mouth once daily 90 tablet 0   • aspirin 81 mg chewable tablet Take 81 mg by mouth daily.     • azithromycin 250 mg tablet Take 2 tablets the first day, then 1 tablet daily for 4 days. 6 tablet 0   • nystatin-triamcinolone (MYCOLOG II) ointment      • olmesartan (BENICAR) 40 mg tablet Take 1 tablet (40 mg total) by mouth daily. 90 tablet 3   • rosuvastatin (CRESTOR) 10 mg tablet TAKE ONE TABLET BY MOUTH ONE TIME DAILY  90 tablet 1   • sildenafiL (VIAGRA) 100 mg tablet Take 1 tablet (100 mg total) by mouth daily as needed for erectile dysfunction. 10 tablet 3   • telmisartan (MICARDIS) 80 mg tablet Take 1 tablet (80 mg total) by mouth daily. 90 tablet 3     No current facility-administered medications for this visit.     Past Medical History:   Diagnosis Date   • Hypercholesteremia    • Hypertension      Past Surgical History:   Procedure Laterality Date   • TONSILLECTOMY       Social History     Tobacco Use   • Smoking status: Current Some Day Smoker     Types: Cigars   • Smokeless  "tobacco: Never Used   Substance Use Topics   • Alcohol use: Yes     Comment: occationally   • Drug use: No     History reviewed. No pertinent family history.    Objective   Vitals:    05/02/22 1105   BP: (!) 162/94   Pulse: 68   Resp: 16   Temp: 36.4 °C (97.5 °F)   Weight: 96.1 kg (211 lb 12.8 oz)   Height: 1.822 m (5' 11.75\")    Body mass index is 28.93 kg/m².  Physical Exam  Constitutional:       General: He is not in acute distress.     Appearance: He is well-developed.   Musculoskeletal:      Comments: Patient is a negative seated straight leg raise on the left and no pain with hip rotation.  Patient has palpable fullness of the left lumbar paraspinal muscles compared to the right.  No spinal tenderness.   Neurological:      Mental Status: He is alert and oriented to person, place, and time.   Psychiatric:         Speech: Speech normal.         Behavior: Behavior normal.         Assessment/Plan   Problem List Items Addressed This Visit     Hyperglycemia    Relevant Orders    Hemoglobin A1c      Other Visit Diagnoses     Left-sided low back pain without sciatica, unspecified chronicity    -  Primary    Relevant Orders    X-RAY LUMBAR SPINE COMPLETE WITH BENDING    Ambulatory referral to Physical Therapy    Healthcare maintenance        Relevant Orders    CBC    Comprehensive metabolic panel    Lipid panel    Urinalysis with microscopic    Screening for prostate cancer        Relevant Orders    PSA      His back pain is likely just muscular in origin so I have referred him to physical therapy and we will get an x-ray to rule out other possible causes.  Treat supportively for now.  Blood pressure is elevated today, continue current medications for now we will recheck at his physical upcoming in June.  Check blood work for routine monitoring of chronic conditions.  Patient agrees and verbalizes understanding of medication and treatment plan discussed at today's visit.  Patient was instructed to call or follow-up if " symptoms persist or worsen.         Schuyler Butt, DO    This note was created with voice recognition software. Inadvertent dictation errors should be disregarded. Please contact my office for clarification.

## 2022-05-03 LAB
ALBUMIN SERPL-MCNC: 4.6 G/DL (ref 3.8–4.8)
ALBUMIN/GLOB SERPL: 2 {RATIO} (ref 1.2–2.2)
ALP SERPL-CCNC: 69 IU/L (ref 44–121)
ALT SERPL-CCNC: 28 IU/L (ref 0–44)
APPEARANCE UR: CLEAR
AST SERPL-CCNC: 28 IU/L (ref 0–40)
BACTERIA #/AREA URNS HPF: NORMAL /[HPF]
BILIRUB SERPL-MCNC: 0.4 MG/DL (ref 0–1.2)
BILIRUB UR QL STRIP: NEGATIVE
BUN SERPL-MCNC: 14 MG/DL (ref 8–27)
BUN/CREAT SERPL: 18 (ref 10–24)
CALCIUM SERPL-MCNC: 8.9 MG/DL (ref 8.6–10.2)
CASTS URNS QL MICRO: NORMAL /LPF
CHLORIDE SERPL-SCNC: 104 MMOL/L (ref 96–106)
CHOLEST SERPL-MCNC: 213 MG/DL (ref 100–199)
CO2 SERPL-SCNC: 23 MMOL/L (ref 20–29)
COLOR UR: YELLOW
CREAT SERPL-MCNC: 0.8 MG/DL (ref 0.76–1.27)
EGFRCR SERPLBLD CKD-EPI 2021: 99 ML/MIN/1.73
EPI CELLS #/AREA URNS HPF: NORMAL /HPF (ref 0–10)
ERYTHROCYTE [DISTWIDTH] IN BLOOD BY AUTOMATED COUNT: 12.6 % (ref 11.6–15.4)
GLOBULIN SER CALC-MCNC: 2.3 G/DL (ref 1.5–4.5)
GLUCOSE SERPL-MCNC: 107 MG/DL (ref 65–99)
GLUCOSE UR QL STRIP: NEGATIVE
HBA1C MFR BLD: 5.8 % (ref 4.8–5.6)
HCT VFR BLD AUTO: 43.4 % (ref 37.5–51)
HDLC SERPL-MCNC: 69 MG/DL
HGB BLD-MCNC: 14.9 G/DL (ref 13–17.7)
HGB UR QL STRIP: NEGATIVE
KETONES UR QL STRIP: NEGATIVE
LDLC SERPL CALC-MCNC: 132 MG/DL (ref 0–99)
LEUKOCYTE ESTERASE UR QL STRIP: NEGATIVE
MCH RBC QN AUTO: 30.8 PG (ref 26.6–33)
MCHC RBC AUTO-ENTMCNC: 34.3 G/DL (ref 31.5–35.7)
MCV RBC AUTO: 90 FL (ref 79–97)
MICRO URNS: NORMAL
MICRO URNS: NORMAL
NITRITE UR QL STRIP: NEGATIVE
PH UR STRIP: 7.5 [PH] (ref 5–7.5)
PLATELET # BLD AUTO: 293 X10E3/UL (ref 150–450)
POTASSIUM SERPL-SCNC: 4.7 MMOL/L (ref 3.5–5.2)
PROT SERPL-MCNC: 6.9 G/DL (ref 6–8.5)
PROT UR QL STRIP: NORMAL
PSA SERPL-MCNC: 1.8 NG/ML (ref 0–4)
RBC # BLD AUTO: 4.84 X10E6/UL (ref 4.14–5.8)
RBC #/AREA URNS HPF: NORMAL /HPF (ref 0–2)
SODIUM SERPL-SCNC: 140 MMOL/L (ref 134–144)
SP GR UR STRIP: 1.02 (ref 1–1.03)
TRIGL SERPL-MCNC: 69 MG/DL (ref 0–149)
UROBILINOGEN UR STRIP-MCNC: 0.2 MG/DL (ref 0.2–1)
VLDLC SERPL CALC-MCNC: 12 MG/DL (ref 5–40)
WBC # BLD AUTO: 8.5 X10E3/UL (ref 3.4–10.8)
WBC #/AREA URNS HPF: NORMAL /HPF (ref 0–5)

## 2022-05-03 RX ORDER — ROSUVASTATIN CALCIUM 20 MG/1
20 TABLET, COATED ORAL
Qty: 90 TABLET | Refills: 3 | Status: SHIPPED | OUTPATIENT
Start: 2022-05-03 | End: 2023-04-04 | Stop reason: SDUPTHER

## 2022-05-03 NOTE — RESULT ENCOUNTER NOTE
Long-term blood sugar test or hemoglobin A1c was similar to previous years at 5.8.  Continue to work on healthy diet and weight loss for improvement.  Your LDL or bad cholesterol is still above 100 At 132, we really should be trying to get this below 100.  We should probably increase your dose of Crestor to see if we can achieve this goal.  Please message me back if you are okay with me sending a higher dose to your pharmacy.  Prostate, urinalysis and the rest of your blood work is normal.

## 2022-05-04 ENCOUNTER — HOSPITAL ENCOUNTER (OUTPATIENT)
Dept: RADIOLOGY | Facility: HOSPITAL | Age: 62
Discharge: HOME | End: 2022-05-04
Attending: FAMILY MEDICINE
Payer: COMMERCIAL

## 2022-05-04 DIAGNOSIS — M54.50 LEFT-SIDED LOW BACK PAIN WITHOUT SCIATICA, UNSPECIFIED CHRONICITY: ICD-10-CM

## 2022-05-04 PROCEDURE — 72114 X-RAY EXAM L-S SPINE BENDING: CPT

## 2022-05-09 NOTE — RESULT ENCOUNTER NOTE
You do have some arthritis present in your low back, there is a slight slippage of the fourth lumbar vertebrae, but this is typically treated with physical therapy.  So I would definitely recommend therapy as the course of action at this point.

## 2022-06-01 ENCOUNTER — OFFICE VISIT (OUTPATIENT)
Dept: FAMILY MEDICINE | Facility: CLINIC | Age: 62
End: 2022-06-01
Payer: COMMERCIAL

## 2022-06-01 VITALS
HEART RATE: 58 BPM | HEIGHT: 72 IN | RESPIRATION RATE: 14 BRPM | BODY MASS INDEX: 27.77 KG/M2 | TEMPERATURE: 98.1 F | WEIGHT: 205 LBS | SYSTOLIC BLOOD PRESSURE: 124 MMHG | DIASTOLIC BLOOD PRESSURE: 82 MMHG

## 2022-06-01 DIAGNOSIS — I10 BENIGN ESSENTIAL HYPERTENSION: ICD-10-CM

## 2022-06-01 DIAGNOSIS — E78.00 PURE HYPERCHOLESTEROLEMIA: ICD-10-CM

## 2022-06-01 DIAGNOSIS — Z00.00 ROUTINE MEDICAL EXAM: Primary | ICD-10-CM

## 2022-06-01 DIAGNOSIS — R73.9 HYPERGLYCEMIA: ICD-10-CM

## 2022-06-01 DIAGNOSIS — D12.6 ADENOMATOUS POLYP OF COLON, UNSPECIFIED PART OF COLON: ICD-10-CM

## 2022-06-01 PROCEDURE — 3074F SYST BP LT 130 MM HG: CPT | Performed by: FAMILY MEDICINE

## 2022-06-01 PROCEDURE — 99396 PREV VISIT EST AGE 40-64: CPT | Performed by: FAMILY MEDICINE

## 2022-06-01 PROCEDURE — 3079F DIAST BP 80-89 MM HG: CPT | Performed by: FAMILY MEDICINE

## 2022-06-01 PROCEDURE — 3008F BODY MASS INDEX DOCD: CPT | Performed by: FAMILY MEDICINE

## 2022-06-01 NOTE — PROGRESS NOTES
"Subjective      Patient ID: Braeden Paige is a 61 y.o. male.    HPI patient is here for routine exam.  He has a history of hypertension high cholesterol and hyperglycemia.  He is tolerating the increased dose of Crestor.  Still could eat healthier and exercise more.  His back pain has resolved.  No concerns at present.     Tobacco  Allergies  Meds  Problems  Med Hx  Surg Hx  Fam Hx         Review of Systems   All other systems reviewed and are negative.    Allergies   Allergen Reactions   • Ace Inhibitors      Other reaction(s): cough     Current Outpatient Medications   Medication Sig Dispense Refill   • amLODIPine (NORVASC) 5 mg tablet Take one tablet by mouth once daily 90 tablet 0   • aspirin 81 mg chewable tablet Take 81 mg by mouth daily.     • nystatin-triamcinolone (MYCOLOG II) ointment      • olmesartan (BENICAR) 40 mg tablet Take 1 tablet (40 mg total) by mouth daily. 90 tablet 3   • rosuvastatin (CRESTOR) 20 mg tablet Take 1 tablet (20 mg total) by mouth once daily. 90 tablet 3   • sildenafiL (VIAGRA) 100 mg tablet Take 1 tablet (100 mg total) by mouth daily as needed for erectile dysfunction. 10 tablet 3   • telmisartan (MICARDIS) 80 mg tablet Take 1 tablet (80 mg total) by mouth daily. 90 tablet 3     No current facility-administered medications for this visit.     Past Medical History:   Diagnosis Date   • Hypercholesteremia    • Hypertension      Past Surgical History:   Procedure Laterality Date   • TONSILLECTOMY       Social History     Tobacco Use   • Smoking status: Current Some Day Smoker     Types: Cigars   • Smokeless tobacco: Never Used   Substance Use Topics   • Alcohol use: Yes     Comment: occationally   • Drug use: No     History reviewed. No pertinent family history.    Objective   Vitals:    06/01/22 0759   BP: 124/82   Pulse: (!) 58   Resp: 14   Temp: 36.7 °C (98.1 °F)   Weight: 93 kg (205 lb)   Height: 1.822 m (5' 11.75\")    Body mass index is 28 kg/m².  Physical " Exam  Constitutional:       Appearance: He is well-developed. He is not toxic-appearing.   HENT:      Head: Normocephalic.   Eyes:      General: Lids are normal.      Conjunctiva/sclera: Conjunctivae normal.   Cardiovascular:      Rate and Rhythm: Normal rate and regular rhythm.      Heart sounds: Normal heart sounds. No murmur heard.  Pulmonary:      Effort: Pulmonary effort is normal.      Breath sounds: Normal breath sounds.   Neurological:      Mental Status: He is alert and oriented to person, place, and time.         Assessment/Plan   Problem List Items Addressed This Visit     Benign essential hypertension    Pure hypercholesterolemia    Relevant Orders    Comprehensive metabolic panel    Lipid panel    Hyperglycemia    Adenomatous polyp of colon      Other Visit Diagnoses     Routine medical exam    -  Primary      Check blood work in 1 month on increased dose of Crestor.  Chronic conditions are otherwise stable, continue attempts at healthy diet, lifestyle and exercise.  Continue current medications.  All other screenings up-to-date.    Patient agrees and verbalizes understanding of medication and treatment plan discussed at today's visit.  Patient was instructed to call or follow-up if symptoms persist or worsen.         Schuyler Butt, DO    This note was created with voice recognition software. Inadvertent dictation errors should be disregarded. Please contact my office for clarification.

## 2022-07-28 ENCOUNTER — APPOINTMENT (OUTPATIENT)
Dept: URBAN - METROPOLITAN AREA CLINIC 203 | Age: 62
Setting detail: DERMATOLOGY
End: 2022-07-30

## 2022-07-28 DIAGNOSIS — Z11.52 ENCOUNTER FOR SCREENING FOR COVID-19: ICD-10-CM

## 2022-07-28 DIAGNOSIS — D485 NEOPLASM OF UNCERTAIN BEHAVIOR OF SKIN: ICD-10-CM

## 2022-07-28 PROBLEM — D48.5 NEOPLASM OF UNCERTAIN BEHAVIOR OF SKIN: Status: ACTIVE | Noted: 2022-07-28

## 2022-07-28 PROCEDURE — OTHER SCREENING FOR COVID-19: OTHER

## 2022-07-28 PROCEDURE — 11102 TANGNTL BX SKIN SINGLE LES: CPT

## 2022-07-28 PROCEDURE — OTHER BIOPSY BY SHAVE METHOD: OTHER

## 2022-07-28 ASSESSMENT — LOCATION SIMPLE DESCRIPTION DERM
LOCATION SIMPLE: RIGHT FOREARM
LOCATION SIMPLE: ABDOMEN

## 2022-07-28 ASSESSMENT — LOCATION DETAILED DESCRIPTION DERM
LOCATION DETAILED: RIGHT PROXIMAL RADIAL DORSAL FOREARM
LOCATION DETAILED: EPIGASTRIC SKIN

## 2022-07-28 ASSESSMENT — PAIN INTENSITY VAS: HOW INTENSE IS YOUR PAIN 0 BEING NO PAIN, 10 BEING THE MOST SEVERE PAIN POSSIBLE?: NO PAIN

## 2022-07-28 ASSESSMENT — LOCATION ZONE DERM
LOCATION ZONE: ARM
LOCATION ZONE: TRUNK

## 2022-08-13 ENCOUNTER — NURSE TRIAGE (OUTPATIENT)
Dept: FAMILY MEDICINE | Facility: CLINIC | Age: 62
End: 2022-08-13
Payer: COMMERCIAL

## 2022-08-13 NOTE — TELEPHONE ENCOUNTER
PAXLOVID Patient Eligibility Screening Checklist     If the answer to ALL points is Yes, patient considered eligible for Paxlovid prescription.     · Positive SARS-CoV-2 test (Confirmation of a positive home rapid SARS-CoV-2 test result with additional direct SARS-CoV-2 viral testing is not required.) yes    · Age >/= 18 years OR > 12 years of age and weighing at least 40 kg yes       · Has one or more risk factors for progression to severe COVID-19 (Risk factors have changed over time, and additional risk factors [such as being unvaccinated or having not received a booster] could be considered. Healthcare providers should consider the benefit-risk for an individual patient. yes     · Symptoms consistent with mild to moderate COVID-19 yes    · Symptom onset within 5 days (Prescriber is encouraged to include a note to the pharmacist in the prescription stating: Please fill prescription by [insert date]. This prescription fill by date is within 5 days from symptom onset and complies with the patient eligibility criteria under the EUA.) yes    · Not requiring hospitalization due to severe or critical COVID-19 at treatment initiation no    · No known or suspected severe renal impairment (eGFR < 30 mL/min) no  eGFR   Date Value Ref Range Status   05/02/2022 99 >59 mL/min/1.73 Final   ·   · Note that a dose reduction is required for patients with moderate renal impairment (eGFR >/= 30-<60 mL/min); see the Fact Sheet for Healthcare Providers.  · Prescriber may rely on patient history and access to the patient's health records to make an assessment regarding the likelihood of renal impairment. Providers may consider ordering a serum creatinine or calculating the estimated glomerular filtration rate (eGFR) for certain patients after assessment on a case-by-case basis based on history or exam.    · No known or suspected severe hepatic impairment (Child-Best Class C) no    · No history of clinically significant  hypersensitivity reactions [e.g., toxic epidermal necrolysis (TEN) or Ellison-Gabino syndrome] to the active ingredients (nirmatrelvir or ritonavir) or other components of the product no    Additional Considerations if prescribing Paxlovid:    · Concomitant medications reviewed for potential interactions including but not limited to the considerations below (YES/NO:[id number])  · HMG-CoA reductase inhibitors (statins)   · Patient is taking lovastatin or simvastatin, which are contraindicated with PAXLOVID coadministration: The statin can be held 12 hours prior to the first dose of PAXLOVID treatment, held during the 5 days of treatment, and restarted 5 days after completing PAXLOVID.  · Patient is taking atorvastatin or rosuvastatin: Temporary discontinuation of atorvastatin and rosuvastatin during treatment with PAXLOVID should be considered depending on statin dose. Atorvastatin and rosuvastatin do not need to be held prior to or after completing PAXLOVID.  · Hormonal contraceptives containing ethinyl estradiol: Patient is taking a hormonal contraceptive containing ethinyl estradiol:   · The need for an additional non-hormonal method of contraception during the 5 days of PAXLOVID treatment and until one menstrual cycle after stopping PAXLOVID should be recommended.   · Medications for HIV-1 Treatment: Patient is taking medications for the treatment of HIV-1 infection:   · With the exception of maraviroc3, HIV antiretroviral medications can be co-administered with PAXLOVID without dose adjustment, but arranging follow-up by the HIV care provider to monitor for side effects is recommended    · Potential side effects including altered sense of taste, diarrhea, increased blood pressure and muscle aches reviewed with patient yes    · The patient is not taking any medications that are contraindicated with Paxlovid coadministration no    If the answer to ALL points is Yes, patient considered eligible for Paxlovid  "prescription.     Patient meets criteria for Paxlovid, communicates understanding of the risk and benefits and prescription [facility].     PAXLOVID Patient Eligibility Screening Checklist Tool for Prescribers  Reason for Disposition  • HIGH RISK for severe COVID complications (e.g., weak immune system, age > 64 years, obesity with BMI > 25, pregnant, chronic lung disease or other chronic medical condition)  (Exception: Already seen by PCP and no new or worsening symptoms.)    Answer Assessment - Initial Assessment Questions  1. COVID-19 DIAGNOSIS: \"Who made your COVID-19 diagnosis?\" \"Was it confirmed by a positive lab test or self-test?\" If not diagnosed by a doctor (or NP/PA), ask \"Are there lots of cases (community spread) where you live?\" Note: See public health department website, if unsure.      Home test positive  2. COVID-19 EXPOSURE: \"Was there any known exposure to COVID before the symptoms began?\" CDC Definition of close contact: within 6 feet (2 meters) for a total of 15 minutes or more over a 24-hour period.       unknown  3. ONSET: \"When did the COVID-19 symptoms start?\"       Last night  4. WORST SYMPTOM: \"What is your worst symptom?\" (e.g., cough, fever, shortness of breath, muscle aches)      Sore throat  5. COUGH: \"Do you have a cough?\" If Yes, ask: \"How bad is the cough?\"        mild  6. FEVER: \"Do you have a fever?\" If Yes, ask: \"What is your temperature, how was it measured, and when did it start?\"      unkown  7. RESPIRATORY STATUS: \"Describe your breathing?\" (e.g., shortness of breath, wheezing, unable to speak)       normal  8. BETTER-SAME-WORSE: \"Are you getting better, staying the same or getting worse compared to yesterday?\"  If getting worse, ask, \"In what way?\"      Better   9. HIGH RISK DISEASE: \"Do you have any chronic medical problems?\" (e.g., asthma, heart or lung disease, weak immune system, obesity, etc.)      yes  10. VACCINE: \"Have you had the " "COVID-19 vaccine?\" If Yes, ask: \"Which one, how many shots, when did you get it?\"        yes  11. BOOSTER: \"Have you received your COVID-19 booster?\" If Yes, ask: \"Which one and when did you get it?\"        yes  12. PREGNANCY: \"Is there any chance you are pregnant?\" \"When was your last menstrual period?\"        no  13. OTHER SYMPTOMS: \"Do you have any other symptoms?\"  (e.g., chills, fatigue, headache, loss of smell or taste, muscle pain, sore throat)       congestion  14. O2 SATURATION MONITOR:  \"Do you use an oxygen saturation monitor (pulse oximeter) at home?\" If Yes, ask \"What is your reading (oxygen level) today?\" \"What is your usual oxygen saturation reading?\" (e.g., 95%)       NA    Protocols used: CORONAVIRUS (COVID-19) DIAGNOSED OR SUSPECTED-ADULT-      Synopsis:  COVID positive. Paxlovid sent. Strict ER precautions. Hold statin. Will follow up if needed. Reviewed COVID protocol. Pt verbalized understanding.   Disposition:  Call PCP Now  Care Advice:  Care Advice Given     Given By Given At Modified    Rubina Monroy CRNP 8/13/2022 12:17 PM No    CALL PCP NOW:   * You need to discuss this with your doctor (or NP/PA).  * I'll page the on-call provider now. If you haven't heard from the provider (or me) within 30 minutes, call again.    Rubina Monroy CRNP 8/13/2022 12:17 PM No    ALTERNATE DISPOSITION - CALL TELEMEDICINE DOCTOR NOW:  * Telemedicine may be your best choice for care during this COVID-19 outbreak.  * You should call a telemedicine doctor (or NP/PA) now, if your own doctor is not available.    Rubina Monroy CRNP 8/13/2022 12:17 PM No    NOTE TO TRIAGER - IF NO PCP, IF AVAILABLE HAVE OTHER DOCTOR (OR NP/PA) RE-TRIAGE THE PATIENT:  * During this COVID-19 pandemic, the medical community is trying to prevent unnecessary referrals to the emergency department (ED). Some patients are fearful of being exposed to COVID-19 in a medical setting. Second-level triage (re-triage) by a doctor " (or NP/PA) has been shown to reduce ED referrals. Here are resources that may be available in your community.  * PCP SECOND-LEVEL TELEHEALTH TRIAGE: Some PCPs (primary care providers) want to provide re-triage before any of their non-emergent patients are referred to an ED. This requires their approval.  * TELEMEDICINE: Telemedicine is often a preferred source of second-level triage and care during this pandemic. Many practices and some hospitals now offer a telemedicine (virtual visit) service. There are also many national telemedicine [employer] that are delivering COVID-19 care.  * EMERGENCY DEPARTMENT (ED): Some EDs may provide a telephone follow-up service for patients who have COVID-19 with worsening symptoms.    Rubina Monroy CRNP 8/13/2022 12:17 PM No    GENERAL CARE ADVICE FOR COVID-19 SYMPTOMS:  * The symptoms are generally treated the same whether you have COVID-19, influenza or some other respiratory virus.  * Cough: Use cough drops.  * Feeling dehydrated: Drink extra liquids. If the air in your home is dry, use a humidifier.  * Fever: For fever over 101 F (38.3 C), take acetaminophen every 4 to 6 hours (Adults 650 mg) OR ibuprofen every 6 to 8 hours (Adults 400 mg). Before taking any medicine, read all the instructions on the package. Do not take aspirin unless your doctor has prescribed it for you.  * Muscle aches, headache, and other pains: Often this comes and goes with the fever. Take acetaminophen every 4 to 6 hours (Adults 650 mg) OR ibuprofen every 6 to 8 hours (Adults 400 mg). Before taking any medicine, read all the instructions on the package.  * Sore throat: Try throat lozenges, hard candy or warm chicken broth.    Rubina Monroy CRNP 8/13/2022 12:17 PM No    COUGH MEDICINES:   * COUGH DROPS: Over-the-counter cough drops can help a lot, especially for mild coughs. They soothe an irritated throat and remove the tickle sensation in the back of the throat. Cough drops are easy to carry  with you.  * COUGH SYRUP WITH DEXTROMETHORPHAN: An over-the-counter cough syrup can help your cough. The most common cough suppressant in over-the-counter cough medicines is dextromethorphan.  * HOME REMEDY - HARD CANDY: Hard candy works just as well as over-the-counter cough drops. People who have diabetes should use sugar-free candy.  * HOME REMEDY - HONEY: This old home remedy has been shown to help decrease coughing at night. The adult dosage is 2 teaspoons (10 ml) at bedtime.    Rubina Monroy CRNP 8/13/2022 12:17 PM No    COUGH SYRUP WITH DEXTROMETHORPHAN:  * Cough syrups containing the cough suppressant dextromethorphan may help decrease your cough.  * Cough syrup works best for coughs that keep you awake at night. It can also sometimes help in the late stages of a lung or airway infection when the cough is dry and hacking. Cough syrup can be used along with cough drops.  * Examples: Delsym 12-hour Cough, Robitussin Cough Long-Acting, Triaminic Long-Acting, Vicks DayQuil Cough.    Rubina Monroy CRNP 8/13/2022 12:17 PM No    COUGH SYRUP WITH DEXTROMETHORPHAN - EXTRA NOTES AND WARNINGS:  * Do not try to completely stop coughs that produce mucus and phlegm.  * Coughing is helpful. It brings up the mucus from the lungs and helps prevent pneumonia.  * RESEARCH: Some research studies show that dextromethorphan reduces the frequency and severity of cough in those 18 years and older without significant adverse effects. Other studies suggest that dextromethorphan is no better than placebo at reducing a cough.  * DRUG ABUSE: It should be noted that dextromethorphan has become a drug of abuse. This problem is seen most often in teenagers. Overdose symptoms can range from giggling and feeling high to hallucinations and coma.  * WARNING: Do not take dextromethorphan if you are taking a monoamine oxidase (MAO) inhibitor now or in the past 2 weeks. Examples of MAO inhibitors include isocarboxazid (Marplan),  phenelzine (Nardil), selegiline (Eldepryl, Emsam, Zelapar), and tranylcypromine (Parnate).  * WARNING: Do not take dextromethorphan if you are taking venlafaxine (Effexor).  * Before taking any medicine, read all the instructions on the package.    Rubina Monroy CRNP 8/13/2022 12:17 PM No    FEVER MEDICINES:  * For fevers above 101 F (38.3 C) take either acetaminophen or ibuprofen.  * They are over-the-counter (OTC) drugs that help treat both fever and pain. You can buy them at the drugstore.  * The goal of fever therapy is to bring the fever down to a comfortable level. Remember that fever medicine usually lowers fever 2 degrees F (1 - 1 1/2 degrees C).  * ACETAMINOPHEN REGULAR STRENGTH TYLENOL: Take 650 mg (two 325 mg pills) by mouth every 4-6 hours as needed. Each Regular Strength Tylenol pill has 325 mg of acetaminophen. The most you should take each day is 3,250 mg (10 pills a day).  * ACETAMINOPHEN - EXTRA STRENGTH TYLENOL: Take 1,000 mg (two 500 mg pills) every 8 hours as needed. Each Extra Strength Tylenol pill has 500 mg of acetaminophen. The most you should take each day is 3,000 mg (6 pills a day).  * IBUPROFEN (E.G., MOTRIN, ADVIL): Take 400 mg (two 200 mg pills) by mouth every 6 hours. The most you should take each day is 1,200 mg (six 200 mg pills), unless your doctor has told you to take more.    Rubina Monroy CRNP 8/13/2022 12:17 PM No    FEVER MEDICINES - EXTRA NOTES AND WARNINGS:  * Use the lowest amount of medicine that makes your fever better.  * Acetaminophen is thought to be safer than ibuprofen or naproxen in people over 65 years old. Acetaminophen is in many OTC and prescription medicines. It might be in more than one medicine that you are taking. You need to be careful and not take an overdose. An acetaminophen overdose can hurt the liver.  * [employer], the company that makes Tylenol, has different dosage instructions for Tylenol in [place] and the [place]. In [place], the maximum  recommended dose per day is 4,000 mg or twelve Regular-Strength (325 mg) pills. In the [place], the maximum dose per day is ten Regular-Strength (325 mg) pills.  * CAUTION: Do not take acetaminophen if you have liver disease.  * CAUTION: Do not take ibuprofen if you have stomach problems, kidney disease, are pregnant, or have been told by your doctor to avoid this type of anti-inflammatory drug. Do not take ibuprofen for more than 7 days without consulting your doctor.  * Before taking any medicine, read all the instructions on the package.    Rubina Monroy CRNP 8/13/2022 12:17 PM No    COVID-19 - HOW TO PROTECT OTHERS - WHEN YOU ARE SICK WITH COVID-19:  * STAY HOME A MINIMUM OF 5 DAYS: Home isolation is needed for at least 5 days after the symptoms started. Stay home from school or work if you are sick. Do NOT go to [employer] services,  centers, shopping, or other public places. Do NOT use public transportation (e.g., bus, taxis, ride-sharing). Do NOT allow any visitors to your home. Leave the house only if you need to seek urgent medical care.  * WEAR A MASK FOR 10 DAYS: Wear a well-fitted mask for 10 full days any time you are around others inside your home or in public. Do not go to places where you are unable to wear a mask.  * WASH HANDS OFTEN: Wash hands often with soap and water. After coughing or sneezing are important times. If soap and water are not available, use an alcohol-based hand  with at least 60% alcohol, covering all surfaces of your hands and rubbing them together until they feel dry. Avoid touching your eyes, nose, and mouth with unwashed hands.  * CALL AHEAD IF MEDICAL CARE IS NEEDED: If you have a medical appointment, call your doctor's office and tell them you have or may have COVID-19. This will help the office protect themselves and other patients. They will give you directions.    Rubina Monroy CRNP 8/13/2022 12:17 PM No    CALL BACK IF:  * You become  worse    Rubina Monroy CRNP 8/13/2022 12:17 PM No    CARE ADVICE given per COVID-19 - DIAGNOSED OR SUSPECTED (Adult) guideline.       Patient/Caregiver understands and will follow care advice?  Yes, plans to follow advice        Orders Placed This Encounter:  Orders Placed This Encounter   • nirmatrelvir-ritonavir (PAXLOVID) tablet dose package     Sig: Take 3 tablets by mouth 2 (two) times a day for 5 days. Take nirmatrelvir 300 mg (TWO 150mg tablets) PO PLUS ritonavir 100 mg (ONE 100mg tablet) by mouth, with all three tablets taken together twice daily     Dispense:  30 tablet     Refill:  0     Symptoms started last night GFR 99

## 2023-03-30 LAB
ALBUMIN SERPL-MCNC: 4.4 G/DL (ref 3.8–4.8)
ALBUMIN/GLOB SERPL: 1.9 {RATIO} (ref 1.2–2.2)
ALP SERPL-CCNC: 70 IU/L (ref 44–121)
ALT SERPL-CCNC: 30 IU/L (ref 0–44)
AST SERPL-CCNC: 28 IU/L (ref 0–40)
BILIRUB SERPL-MCNC: 0.4 MG/DL (ref 0–1.2)
BUN SERPL-MCNC: 14 MG/DL (ref 8–27)
BUN/CREAT SERPL: 18 (ref 10–24)
CALCIUM SERPL-MCNC: 9.1 MG/DL (ref 8.6–10.2)
CHLORIDE SERPL-SCNC: 103 MMOL/L (ref 96–106)
CHOLEST SERPL-MCNC: 219 MG/DL (ref 100–199)
CO2 SERPL-SCNC: 24 MMOL/L (ref 20–29)
CREAT SERPL-MCNC: 0.79 MG/DL (ref 0.76–1.27)
EGFRCR SERPLBLD CKD-EPI 2021: 99 ML/MIN/1.73
GLOBULIN SER CALC-MCNC: 2.3 G/DL (ref 1.5–4.5)
GLUCOSE SERPL-MCNC: 92 MG/DL (ref 70–99)
HDLC SERPL-MCNC: 59 MG/DL
LDLC SERPL CALC-MCNC: 144 MG/DL (ref 0–99)
POTASSIUM SERPL-SCNC: 4.3 MMOL/L (ref 3.5–5.2)
PROT SERPL-MCNC: 6.7 G/DL (ref 6–8.5)
SODIUM SERPL-SCNC: 140 MMOL/L (ref 134–144)
TRIGL SERPL-MCNC: 89 MG/DL (ref 0–149)
VLDLC SERPL CALC-MCNC: 16 MG/DL (ref 5–40)

## 2023-03-30 NOTE — RESULT ENCOUNTER NOTE
Braeden, your cholesterol numbers are still high unfortunately.  I should be recommending to increase the Crestor dose again to 40 mg.  Please message me back if you agree and I will send it to your pharmacy.

## 2023-04-04 ENCOUNTER — OFFICE VISIT (OUTPATIENT)
Dept: FAMILY MEDICINE | Facility: CLINIC | Age: 63
End: 2023-04-04
Payer: COMMERCIAL

## 2023-04-04 VITALS
DIASTOLIC BLOOD PRESSURE: 70 MMHG | SYSTOLIC BLOOD PRESSURE: 126 MMHG | TEMPERATURE: 98 F | BODY MASS INDEX: 29.23 KG/M2 | WEIGHT: 214 LBS | HEART RATE: 64 BPM | RESPIRATION RATE: 14 BRPM

## 2023-04-04 DIAGNOSIS — K21.9 GASTROESOPHAGEAL REFLUX DISEASE, UNSPECIFIED WHETHER ESOPHAGITIS PRESENT: ICD-10-CM

## 2023-04-04 DIAGNOSIS — E78.00 PURE HYPERCHOLESTEROLEMIA: ICD-10-CM

## 2023-04-04 DIAGNOSIS — R05.3 CHRONIC COUGH: Primary | ICD-10-CM

## 2023-04-04 PROCEDURE — 3078F DIAST BP <80 MM HG: CPT | Performed by: FAMILY MEDICINE

## 2023-04-04 PROCEDURE — 3008F BODY MASS INDEX DOCD: CPT | Performed by: FAMILY MEDICINE

## 2023-04-04 PROCEDURE — 99214 OFFICE O/P EST MOD 30 MIN: CPT | Performed by: FAMILY MEDICINE

## 2023-04-04 PROCEDURE — 3074F SYST BP LT 130 MM HG: CPT | Performed by: FAMILY MEDICINE

## 2023-04-04 RX ORDER — ROSUVASTATIN CALCIUM 40 MG/1
40 TABLET, COATED ORAL
Qty: 90 TABLET | Refills: 3 | Status: SHIPPED | OUTPATIENT
Start: 2023-04-04 | End: 2023-09-05 | Stop reason: SDUPTHER

## 2023-04-04 NOTE — PROGRESS NOTES
Subjective      Patient ID: Braeden Paige is a 62 y.o. male.    HPI patient is here to follow-up on high cholesterol and to discuss cough.  His cough has been chronic intermittent on and off for couple of years.  He reports he has a cough about 15 days out of the month.  He thinks it may be allergy or postnasal drip related.  Patient also admits to some GERD symptoms once a week just takes Tums typically.  He has a history of high cholesterol, his latest cholesterol numbers were still significantly elevated despite being on 20 mg of Crestor.     Tobacco  Allergies  Meds  Problems  Med Hx  Surg Hx  Fam Hx       Review of Systems  Allergies   Allergen Reactions   • Ace Inhibitors      Other reaction(s): cough     Current Outpatient Medications   Medication Sig Dispense Refill   • rosuvastatin (CRESTOR) 40 mg tablet Take 1 tablet (40 mg total) by mouth once daily. 90 tablet 3   • amLODIPine (NORVASC) 5 mg tablet Take one tablet by mouth once daily 90 tablet 3   • aspirin 81 mg chewable tablet Take 81 mg by mouth daily.     • nystatin-triamcinolone (MYCOLOG II) ointment      • olmesartan (BENICAR) 40 mg tablet TAKE 1 TABLET (40 MG TOTAL) BY MOUTH DAILY. 90 tablet 3   • sildenafiL (VIAGRA) 100 mg tablet Take 1 tablet (100 mg total) by mouth daily as needed for erectile dysfunction. 10 tablet 3   • telmisartan (MICARDIS) 80 mg tablet Take 1 tablet (80 mg total) by mouth daily. 90 tablet 3     No current facility-administered medications for this visit.     Past Medical History:   Diagnosis Date   • Hypercholesteremia    • Hypertension      Past Surgical History:   Procedure Laterality Date   • TONSILLECTOMY       Social History     Tobacco Use   • Smoking status: Some Days     Types: Cigars   • Smokeless tobacco: Never   Substance Use Topics   • Alcohol use: Yes     Comment: occationally   • Drug use: No     History reviewed. No pertinent family history.    Objective   Vitals:    04/04/23 1349   BP: 126/70    Pulse: 64   Resp: 14   Temp: 36.7 °C (98 °F)   Weight: 97.1 kg (214 lb)    Body mass index is 29.23 kg/m².  Physical Exam  Constitutional:       General: He is not in acute distress.     Appearance: He is well-developed.   Neurological:      Mental Status: He is alert and oriented to person, place, and time.   Psychiatric:         Speech: Speech normal.         Behavior: Behavior normal.         Assessment/Plan   Problem List Items Addressed This Visit     Pure hypercholesterolemia    Relevant Medications    rosuvastatin (CRESTOR) 40 mg tablet   Other Visit Diagnoses     Chronic cough    -  Primary    Relevant Orders    X-RAY CHEST 2 VIEWS    Gastroesophageal reflux disease, unspecified whether esophagitis present          Check chest x-ray for chronic cough, I recommend the patient try over-the-counter Nexium or Prilosec for 1 month to see if this improves his cough symptoms in relationship to GERD as the causal agent.  For his cholesterol I recommended we increase his Crestor dose to prove his numbers.  Patient agrees.  Side effects discussed.  He will follow-up with me in 2 to 3 months for full physical and repeat blood work at that time.    Patient agrees and verbalizes understanding of medication and treatment plan discussed at today's visit.  Patient was instructed to call or follow-up if symptoms persist or worsen.         Schuyler Butt, DO    This note was created with voice recognition software. Inadvertent dictation errors should be disregarded. Please contact my office for clarification.

## 2023-04-06 ENCOUNTER — HOSPITAL ENCOUNTER (OUTPATIENT)
Dept: RADIOLOGY | Facility: HOSPITAL | Age: 63
Discharge: HOME | End: 2023-04-06
Attending: FAMILY MEDICINE
Payer: COMMERCIAL

## 2023-04-06 DIAGNOSIS — R05.3 CHRONIC COUGH: ICD-10-CM

## 2023-04-06 PROCEDURE — 71046 X-RAY EXAM CHEST 2 VIEWS: CPT

## 2023-05-10 ENCOUNTER — APPOINTMENT (OUTPATIENT)
Dept: URBAN - METROPOLITAN AREA CLINIC 203 | Age: 63
Setting detail: DERMATOLOGY
End: 2023-05-16

## 2023-05-10 DIAGNOSIS — L82.1 OTHER SEBORRHEIC KERATOSIS: ICD-10-CM

## 2023-05-10 DIAGNOSIS — L57.0 ACTINIC KERATOSIS: ICD-10-CM

## 2023-05-10 PROCEDURE — 17000 DESTRUCT PREMALG LESION: CPT

## 2023-05-10 PROCEDURE — OTHER LIQUID NITROGEN: OTHER

## 2023-05-10 PROCEDURE — OTHER COUNSELING: OTHER

## 2023-05-10 PROCEDURE — OTHER REASSURANCE: OTHER

## 2023-05-10 PROCEDURE — 99212 OFFICE O/P EST SF 10 MIN: CPT | Mod: 25

## 2023-05-10 ASSESSMENT — LOCATION ZONE DERM
LOCATION ZONE: SCALP
LOCATION ZONE: ARM

## 2023-05-10 ASSESSMENT — LOCATION SIMPLE DESCRIPTION DERM
LOCATION SIMPLE: SCALP
LOCATION SIMPLE: RIGHT FOREARM
LOCATION SIMPLE: RIGHT UPPER ARM

## 2023-05-10 ASSESSMENT — LOCATION DETAILED DESCRIPTION DERM
LOCATION DETAILED: RIGHT ANTERIOR PROXIMAL UPPER ARM
LOCATION DETAILED: RIGHT CENTRAL FRONTAL SCALP
LOCATION DETAILED: RIGHT VENTRAL DISTAL FOREARM

## 2023-05-10 ASSESSMENT — PAIN INTENSITY VAS: HOW INTENSE IS YOUR PAIN 0 BEING NO PAIN, 10 BEING THE MOST SEVERE PAIN POSSIBLE?: NO PAIN

## 2023-05-10 NOTE — PROCEDURE: COUNSELING
Your Child's Health  15-Month-Old Visit      Allyson Tatum  June 27, 2022    Visit Vitals  Pulse 120   Temp 97.3 °F (36.3 °C) (Temporal)   Resp 32   Ht 32.48\" (82.5 cm)   Wt 10 kg (22 lb 0.7 oz)   HC 47.6 cm (18.74\")   BMI 14.69 kg/m²     Weight:     YOUR CHILD’S 15 MONTH-OLD VISIT      Key points at this age…  Allyson should continue to ride in a properly-installed car seat in the back seat. Correct use of a car seat is always critically important for your baby’s safety. For maximum safety, keep the seat rear facing until your child reaches the top height or weight limit allowed by the car seat .        Lifelong nutritional habits begin now. Avoid starting unhealthy foods (fried fast food, sweets, chips, other bagged snacks) and sweetened drinks like juice and soda. If you do give juice, limit it to 4 ounces or less of 100% fruit juice daily.    Take care of that kris smile! Brush twice daily with a tiny amount of regular (not baby) toothpaste. Avoid sugary and sticky foods as well as juice.       NUTRITION:    It is normal for a child’s appetite to go up and down quite a bit after one year of age. This is because the rate of growth is slowing down - it is normal! Let them work on their self-feeding skills (finger foods, using a spoon) even if they are messy and you don't think they are eating enough. Some days they will eat a lot, some days much less. The key is to offer them healthy food choices in small amounts. If they finish what you give them, they can have more. Most \"picky\" eaters still grow and gain weight normally! Overweight and obesity are serious health problems in our [place], and they often start in childhood. So don't push your child to eat more than they want and don’t give unhealthy foods (fried fast food, sweets, chips and other bagged snacks) to them. Right now you are in charge of what's going into them - keep it healthy!    Milk and water are the healthiest drink choices  for your child. The milk is important because it provides calcium for bone health; they should ideally get 16 to 20 ounces of milk each day.     Eating fruit is much healthier than drinking juice. Even \"natural\" juices have extra sugars that can lead to tooth decay and weight gain. Children between 1 and 3 years of age should have no more than 4 ounces of 100% fruit juice daily. Do not water it down in a bottle or sippy cup that they can carry around and drink from over an extended period of time; this frequent exposure to the sugars in juice (even if diluted with water) just increases their risk of tooth decay.      Keep avoiding hard, chunky or chewy foods that a child could choke on. Be careful when serving foods like fruits (especially grapes) and vegetables--make sure they are cut into small pieces that will not pose a choking risk.     Even when eating a very well balanced diet, children need some extra vitamin D. A liquid preparation of pure vitamin D (400 or 600 IU) or a multivitamin with iron drop is recommended.  (They are currently too young for a chewable vitamin because they could choke on those.)    Everything your child drinks at this age should be from a cup. If your child is not yet off the bottle, talk to your doctor about ways to work on this. Using the bottle at this age is only going to cause problems (nutritional, dental).      DEVELOPMENT:  Most children at this age will listen to a story and imitate what you do.  They may have just a few words (besides “mama” and “catrina”) but will let you know what they want by pulling you towards something, grunting and pointing. They understand when you tell them to do simple things (although they might not always do it!). They may scribble if you give them a crayon or marker. And they will start moving around faster and faster! Most will run and climb whenever they see an opportunity and really won’t be bothered by minor falls and bruises--watching a  toddler requires constant attention!    They may bring a book to you to read--encourage this because books are a great way to work on their language. (You don’t even have to read the story--just point to pictures, talk about the story and encourage your child to say words.) Avoid the temptation to put them in front of the TV or to put a cell phone or pad in their hands--the American Academy of Pediatrics does not recommend any “screen time” before age 2 years.      DISCIPLINE:  Parents and older family members need to agree on rules about how to respond to your toddler when he or she does something dangerous or undesirable (like hitting or biting - those behaviors will not be cute as they grow older). Consistency is important. If all family members react to the child's behaviors in the same way, the child will soon learn which behaviors are more likely to earn them praise and smiles (positive attention).     Keep it simple at this age. Saying a firm “no”, redirecting the child to a different toy or activity or briefly ignoring them (as long as they are not getting into harm’s way) will work better than yelling or long explanations about why you want them to not do something. Long lectures may actually reinforce the undesired behavior because you are paying attention to your child when you are lecturing them--remember that your attention is what your child wants most from you!     Temper tantrums may start in the near future. Toddlers get frustrated easily because they realize there are so many things they want to be doing, but they just are not capable (or permitted!) to do them. If your child starts having tantrums, leave them in a safe place (like the center of a room) and walk away or turn your back.  Don't say anything until they are done. Once they quiet down, don't lecture them (they don't get that!). Instead, start a new activity or grab a book - they need to focus on something new rather than dwell on what  upset them. To make life easier (and reduce the number of rules in your home), set up your home to be as safe as possible. Put away dangerous and valuable or fragile items. If you have fewer things that are off-limits to children, there are fewer opportunities for them to get into trouble.    DENTAL CARE:   Establish a regular brushing routine twice a day, ideally after breakfast and before bed. You should be brushing twice a day with a tiny smear (the size of a grain of rice!) of regular (fluoridated) toothpaste (not baby toothpaste).  Many toddlers want to do this themselves--they may be enthusiastic about it, but they are going to miss spots! It’s important for a parent to get in there every time and make sure all the teeth have been cleaned.     Fluoride is very important for your child’s dental health.  In addition to brushing with fluoridated toothpaste, they should be drinking the tap (city) water (which is carefully monitored so it has the ideal amount of fluoride for dental health). If you have well water instead of a city water supply, however, you should have it tested for fluoride content to determine whether your child might need fluoride supplements.     SAFETY/ACCIDENT PREVENTION:    Car Safety:  An approved car seat in the back seat is required by [place] law. Your child is safest in a rear-facing convertible car seat right now: keep the seat in a rear-facing position until your child reaches the top height or weight limit allowed by the car seat . (Even if your child is tall and they have to keep their legs bent, they are still safer when rear facing.)    Poisoning: Keep all cleaning and chemical products safely stored out of sight and out of reach. (Check for what is under your bathroom sink as well as your kitchen sink.) Keep purses (your own and ones belonging to visitors) out of reach of curious toddlers; they can quickly find medicines, cigarettes, and small objects to choke on!      Keep the original bottles and safety caps for all medicines, including vitamins; do not transfer medicines to non-child-proofed or unlabeled containers. Be especially careful when around older people because they may keep their medicines out in the open or in non-childproofed containers.     Is this number in your cell phone? Call the Poison Center at 1-126.307.9099 for any known or suspected poisoning.       Falls: Your toddler will start moving around faster and faster! Be aware of what they can run into (furniture with sharp edges) and fall down, out or off of (stairs, windows, stepstools). Keep mendes on stairs and window latches on upper-story windows.    Burns: A toddler’s independence puts them at risk for burns because they want to touch everything they see (pans on the stove or freshly out of the oven, any water faucet within reach, irons, curling irons). Keep your water heater at a maximum temperature of 120-125°F to prevent scald burns. Be very careful to keep hot items out of reach. Have a family fire safety plan, including regular smoke detector (and carbon monoxide detector) battery checks, working fire extinguishers, and escape routes.    Water Safety: Toddlers often love the water but they need to be very closely supervised around it (this means tubs, buckets, wading pools and toilets as well as pools and lakes!).Children can drown in just a couple inches of water, so never leave an infant or toddler alone in a tub. Also, do not rely on older children to properly supervise the younger ones. An adult should always be within arm’s reach whenever a young child is in or around water.     Most children are not developmentally ready for swimming lessons until ~4 years of age. Swim programs for younger children have not been proven to prevent drowning, nor will life jackets and “swimmies” protect your child from drowning! Constant supervision by an adult who knows how to swim is critical. Permanent pools  (in ground and above ground) should be fenced off (and locked), and wading pools should be drained when not in use. Keep large buckets empty and keep toilet seat lids down and secured with a safety lock if possible.     Smoke Exposure: Continue to protect your child from cigarette smoke. Exposure to smoke will increase their risk of asthma, ear infections, and respiratory infections (pneumonia). If you smoke and are ready to consider quitting, talk to your doctor. Nicotine replacement products can be very helpful in breaking this tough addiction.       SLEEP: Consistency is key to good sleep habits!  1.  Provide a consistent dinner time (ideally not immediately before bedtime).   2.  Provide a consistent bedtime--keep it the same for weeknights and weekdays.  3.  Avoid stimulating activity before bedtime, including scary or intense movies or TV shows and high-energy physical activity or play.   4. Provide a consistent and reassuring routine (bath, brushing teeth, song/story, sleep). Your child should always be put to bed in the same bed.  5. Your child should always be put to bed sleepy but awake.  6.  Provide a “transition” object.  This could be a pillow, favorite blanket, or stuffed animal. Don't give pacifiers or other items to suck on; also avoid items on strings and anything with small pieces that could be swallowed.  7.  Provide a consistent wake-up time (unless your child is ill and needs extra sleep).    8. Night waking may develop, even in children who have previously slept well. If your child wakes up at night, visit them briefly, but do not spend a lot of time or do a lot of talking or extended reassurance. Make sure they have their “transition” object so they can console themselves back to sleep.     SHOES:  Children need shoes to protect their feet when they start walking outside. They don’t need special arches or  fashions.  Buy shoes that have a roomy fit and flat, flexible soles with nonskid  bottoms. Inexpensive ones are okay--they outgrow them quickly!     SUN EXPOSURE:  Protect your child from direct sun as much as you can. Keep them in the shade as much as possible and use protective clothing (including hats and sunglasses) when you are out. Always use sunscreen when your child is going to be outside. Choose one labelled as “broad spectrum” (which means it protects against both UVA and UVB rays) and with an SPF of 15 to 30; apply it to your child’s skin at least 20 to 30 minutes before going out in the sun. Zinc oxide (or titanium dioxide) products are good for sensitive spots (nose, cheeks, ears, shoulders); these don’t “rub in” all the way, but kids often like the fun colors they come in!      MEDICATION FOR FEVER OR PAIN:   Acetaminophen liquid (e.g., Tylenol or Tempra) may be given every four hours as needed for pain or fever.  Be sure to check which product CONCENTRATION you are using.    INFANT Tylenol/Acetaminophen  Drops (160 mg/5 mL)    Child’s Weight: Dose:  18 - 23 pounds:   120 mg (3.75 mL (3/4 Teaspoon))  23 - 27 pounds:   160 mg (5.0 mL (1 Teaspoon))    CHILDREN’S Tylenol/Acetaminophen  (160 mg/5 mL)    Child’s Weight: Dose:  18 - 23 pounds:   120 mg (3.75 mL (3/4 Teaspoon))  23 - 27 pounds:   160 mg (5.0 mL (1 Teaspoon))    INFANT Ibuprofen (50 mg/1.25 ml) liquid (for example Advil or Motrin) may be given every 6 hours as needed for pain or fever.    Child’s Weight: Dose:  18 - 23 pounds:   75 mg (1.875 mL)    CHILDREN'S Ibuprofen (100 mg/5 mL) liquid (for example Advil or Motrin) may be given every 6 hours as needed for pain or fever.    Child’s Weight: Dose:  18 - 23 pounds:   75 mg (3.75 mL (3/4 Teaspoon))  24 - 35 pounds:   100 mg (5 mL (1Teaspoon)))    If Allyson is outside these weight ranges, call your pediatrician's office for advice.    Most Recent Immunizations   Administered Date(s) Administered    DTaP/Hep B/IPV 2021    Hep A, ped/adol, 2 dose 03/28/2022    Hib  (PRP-OMP) 2021    MMR 03/28/2022    Pneumococcal Conjugate 13 Valent Vacc (Prevnar 13) 2021    Rotavirus - pentavalent 2021    Varicella 03/28/2022       If Allyson develops any of the following reactions within 72 hours after an immunization, notify your pediatrician by calling the pediatric phone nurse:  A temperature of 105 degrees or above  More than 3 hours of continuous crying  A shrill, high-pitched cry  A pale, limp spell  A seizure or fainting spell.  In this case, you should call 911 or go immediately to the emergency room.      NEXT VISIT: 18 MONTHS OF AGE    Thank you for entrusting your care to [facility].      Also, check out “Children’s Health” on the [facility] Blog for updates on timely topics regarding children’s health!   Detail Level: Detailed

## 2023-05-10 NOTE — PROCEDURE: LIQUID NITROGEN
Number Of Freeze-Thaw Cycles: 2 freeze-thaw cycles
Post-Care Instructions: I reviewed with the patient in detail post-care instructions. Patient is to wear sunprotection, and avoid picking at any of the treated lesions. Pt may apply Vaseline to crusted or scabbing areas.
Consent: The patient's consent was obtained including but not limited to risks of crusting, scabbing, blistering, scarring, darker or lighter pigmentary change, recurrence, incomplete removal and infection.
Render Note In Bullet Format When Appropriate: No
Application Tool (Optional): Liquid Nitrogen Sprayer
Show Aperture Variable?: Yes
Duration Of Freeze Thaw-Cycle (Seconds): 5
Detail Level: Detailed

## 2023-07-27 ENCOUNTER — OFFICE VISIT (OUTPATIENT)
Dept: FAMILY MEDICINE | Facility: CLINIC | Age: 63
End: 2023-07-27
Payer: COMMERCIAL

## 2023-07-27 VITALS
DIASTOLIC BLOOD PRESSURE: 90 MMHG | SYSTOLIC BLOOD PRESSURE: 136 MMHG | BODY MASS INDEX: 29.46 KG/M2 | HEART RATE: 68 BPM | HEIGHT: 71 IN | WEIGHT: 210.4 LBS | OXYGEN SATURATION: 99 % | RESPIRATION RATE: 16 BRPM

## 2023-07-27 DIAGNOSIS — Z00.00 ROUTINE MEDICAL EXAM: Primary | ICD-10-CM

## 2023-07-27 DIAGNOSIS — R73.9 HYPERGLYCEMIA: ICD-10-CM

## 2023-07-27 DIAGNOSIS — E78.00 PURE HYPERCHOLESTEROLEMIA: ICD-10-CM

## 2023-07-27 DIAGNOSIS — D12.6 ADENOMATOUS POLYP OF COLON, UNSPECIFIED PART OF COLON: ICD-10-CM

## 2023-07-27 DIAGNOSIS — I10 BENIGN ESSENTIAL HYPERTENSION: ICD-10-CM

## 2023-07-27 DIAGNOSIS — Z12.5 SCREENING FOR PROSTATE CANCER: ICD-10-CM

## 2023-07-27 PROCEDURE — 3008F BODY MASS INDEX DOCD: CPT | Performed by: FAMILY MEDICINE

## 2023-07-27 PROCEDURE — 99396 PREV VISIT EST AGE 40-64: CPT | Performed by: FAMILY MEDICINE

## 2023-07-27 PROCEDURE — 3080F DIAST BP >= 90 MM HG: CPT | Performed by: FAMILY MEDICINE

## 2023-07-27 PROCEDURE — 3075F SYST BP GE 130 - 139MM HG: CPT | Performed by: FAMILY MEDICINE

## 2023-07-27 PROCEDURE — 36415 COLL VENOUS BLD VENIPUNCTURE: CPT | Performed by: FAMILY MEDICINE

## 2023-07-27 NOTE — PROGRESS NOTES
"Subjective      Patient ID: Braeden Paige is a 62 y.o. male.    HPI patient is here for routine exam.  History of high cholesterol and hyperglycemia.  Currently tolerating high-dose Crestor.  Patient feels well knows he needs to exercise more and eat healthier.     Tobacco  Allergies  Meds  Problems  Med Hx  Surg Hx  Fam Hx       Review of Systems   All other systems reviewed and are negative.    Allergies   Allergen Reactions   • Ace Inhibitors      Other reaction(s): cough     Current Outpatient Medications   Medication Sig Dispense Refill   • amLODIPine (NORVASC) 5 mg tablet Take one tablet by mouth once daily 90 tablet 3   • aspirin 81 mg chewable tablet Take 81 mg by mouth daily.     • nystatin-triamcinolone (MYCOLOG II) ointment      • olmesartan (BENICAR) 40 mg tablet TAKE 1 TABLET (40 MG TOTAL) BY MOUTH DAILY. 90 tablet 3   • rosuvastatin (CRESTOR) 40 mg tablet Take 1 tablet (40 mg total) by mouth once daily. 90 tablet 3   • sildenafiL (VIAGRA) 100 mg tablet Take 1 tablet (100 mg total) by mouth daily as needed for erectile dysfunction. 10 tablet 3   • telmisartan (MICARDIS) 80 mg tablet Take 1 tablet (80 mg total) by mouth daily. 90 tablet 3     No current facility-administered medications for this visit.     Past Medical History:   Diagnosis Date   • Hypercholesteremia    • Hypertension      Past Surgical History:   Procedure Laterality Date   • TONSILLECTOMY       Social History     Tobacco Use   • Smoking status: Some Days     Types: Cigars   • Smokeless tobacco: Never   Substance Use Topics   • Alcohol use: Yes     Comment: occationally   • Drug use: No     History reviewed. No pertinent family history.    Objective   Vitals:    07/27/23 1013   BP: 136/90   Pulse: 68   Resp: 16   SpO2: 99%   Weight: 95.4 kg (210 lb 6.4 oz)   Height: 1.803 m (5' 11\")    Body mass index is 29.34 kg/m².  Physical Exam  Constitutional:       General: He is not in acute distress.     Appearance: He is " well-developed. He is not toxic-appearing.   HENT:      Head: Normocephalic.      Right Ear: Tympanic membrane, ear canal and external ear normal.      Left Ear: Tympanic membrane, ear canal and external ear normal.      Nose: Nose normal.      Mouth/Throat:      Mouth: Mucous membranes are moist.      Pharynx: No oropharyngeal exudate or posterior oropharyngeal erythema.   Eyes:      General: Lids are normal.      Conjunctiva/sclera: Conjunctivae normal.   Neck:      Thyroid: No thyroid mass or thyromegaly.   Cardiovascular:      Rate and Rhythm: Normal rate and regular rhythm.      Heart sounds: Normal heart sounds. No murmur heard.  Pulmonary:      Effort: Pulmonary effort is normal. No respiratory distress.      Breath sounds: Normal breath sounds.   Genitourinary:     Rectum: Normal.      Comments: Prostate 1+ enlarged, smooth, no nodules  Musculoskeletal:      Cervical back: Neck supple.   Lymphadenopathy:      Head:      Right side of head: No submental, submandibular, preauricular, posterior auricular or occipital adenopathy.      Left side of head: No submental, submandibular, preauricular, posterior auricular or occipital adenopathy.      Cervical: No cervical adenopathy.   Psychiatric:      Comments: Alert and oriented         Assessment/Plan   Problem List Items Addressed This Visit     Benign essential hypertension    Pure hypercholesterolemia    Hyperglycemia    Relevant Orders    Hemoglobin A1c    Adenomatous polyp of colon   Other Visit Diagnoses     Routine medical exam    -  Primary    Relevant Orders    CBC    Comprehensive metabolic panel    Lipid panel    Urinalysis with microscopic    Screening for prostate cancer        Relevant Orders    PSA      Check blood work and urinalysis for routine screening and monitoring of chronic conditions.  Continue attempts at healthy diet, lifestyle and exercise.  Colonoscopy next year.    Patient agrees and verbalizes understanding of medication and treatment  plan discussed at today's visit.  Patient was instructed to call or follow-up if symptoms persist or worsen.         Schuyler Butt, DO    This note was created with voice recognition software. Inadvertent dictation errors should be disregarded. Please contact my office for clarification.   No

## 2023-07-28 LAB
ALBUMIN SERPL-MCNC: 4.6 G/DL (ref 3.9–4.9)
ALBUMIN/GLOB SERPL: 2.1 {RATIO} (ref 1.2–2.2)
ALP SERPL-CCNC: 65 IU/L (ref 44–121)
ALT SERPL-CCNC: 29 IU/L (ref 0–44)
APPEARANCE UR: CLEAR
AST SERPL-CCNC: 21 IU/L (ref 0–40)
BACTERIA #/AREA URNS HPF: NORMAL /[HPF]
BILIRUB SERPL-MCNC: 0.5 MG/DL (ref 0–1.2)
BILIRUB UR QL STRIP: NEGATIVE
BUN SERPL-MCNC: 21 MG/DL (ref 8–27)
BUN/CREAT SERPL: 28 (ref 10–24)
CALCIUM SERPL-MCNC: 9.4 MG/DL (ref 8.6–10.2)
CASTS URNS QL MICRO: NORMAL /LPF
CHLORIDE SERPL-SCNC: 104 MMOL/L (ref 96–106)
CHOLEST SERPL-MCNC: 256 MG/DL (ref 100–199)
CO2 SERPL-SCNC: 22 MMOL/L (ref 20–29)
COLOR UR: YELLOW
CREAT SERPL-MCNC: 0.76 MG/DL (ref 0.76–1.27)
EGFRCR SERPLBLD CKD-EPI 2021: 100 ML/MIN/1.73
EPI CELLS #/AREA URNS HPF: NORMAL /HPF (ref 0–10)
ERYTHROCYTE [DISTWIDTH] IN BLOOD BY AUTOMATED COUNT: 12.5 % (ref 11.6–15.4)
GLOBULIN SER CALC-MCNC: 2.2 G/DL (ref 1.5–4.5)
GLUCOSE SERPL-MCNC: 110 MG/DL (ref 70–99)
GLUCOSE UR QL STRIP: NEGATIVE
HBA1C MFR BLD: 5.8 % (ref 4.8–5.6)
HCT VFR BLD AUTO: 44.2 % (ref 37.5–51)
HDLC SERPL-MCNC: 57 MG/DL
HGB BLD-MCNC: 14.9 G/DL (ref 13–17.7)
HGB UR QL STRIP: NEGATIVE
KETONES UR QL STRIP: NEGATIVE
LDLC SERPL CALC-MCNC: 184 MG/DL (ref 0–99)
LEUKOCYTE ESTERASE UR QL STRIP: NEGATIVE
MCH RBC QN AUTO: 30 PG (ref 26.6–33)
MCHC RBC AUTO-ENTMCNC: 33.7 G/DL (ref 31.5–35.7)
MCV RBC AUTO: 89 FL (ref 79–97)
MICRO URNS: NORMAL
MICRO URNS: NORMAL
NITRITE UR QL STRIP: NEGATIVE
PH UR STRIP: 6 [PH] (ref 5–7.5)
PLATELET # BLD AUTO: 290 X10E3/UL (ref 150–450)
POTASSIUM SERPL-SCNC: 4.4 MMOL/L (ref 3.5–5.2)
PROT SERPL-MCNC: 6.8 G/DL (ref 6–8.5)
PROT UR QL STRIP: NEGATIVE
PSA SERPL-MCNC: 1.4 NG/ML (ref 0–4)
RBC # BLD AUTO: 4.97 X10E6/UL (ref 4.14–5.8)
RBC #/AREA URNS HPF: NORMAL /HPF (ref 0–2)
SODIUM SERPL-SCNC: 140 MMOL/L (ref 134–144)
SP GR UR STRIP: 1.02 (ref 1–1.03)
TRIGL SERPL-MCNC: 90 MG/DL (ref 0–149)
UROBILINOGEN UR STRIP-MCNC: 0.2 MG/DL (ref 0.2–1)
VLDLC SERPL CALC-MCNC: 15 MG/DL (ref 5–40)
WBC # BLD AUTO: 8.5 X10E3/UL (ref 3.4–10.8)
WBC #/AREA URNS HPF: NORMAL /HPF (ref 0–5)

## 2023-07-28 NOTE — RESULT ENCOUNTER NOTE
With patient significantly elevated cholesterol despite maximum dose Crestor, the rest of his blood work is stable.  We discussed possible options such as Zetia, PSK 9 inhibitor with cardiology consult.  Patient to consider, research these medications, talk to his brother who is a cardiologist and get back to me.

## 2023-09-01 ENCOUNTER — TELEPHONE (OUTPATIENT)
Dept: FAMILY MEDICINE | Facility: CLINIC | Age: 63
End: 2023-09-01
Payer: COMMERCIAL

## 2023-09-01 NOTE — TELEPHONE ENCOUNTER
Patient called, he is in [facility] and is out of meds. He would like his rosuvastatin, amlodipine, and olmesartan, a 30 day supply of each sent to the [facility] pharmacy. Please call patient when meds are sent.

## 2023-09-05 RX ORDER — AMLODIPINE BESYLATE 5 MG/1
5 TABLET ORAL DAILY
Qty: 30 TABLET | Refills: 0 | Status: SHIPPED | OUTPATIENT
Start: 2023-09-05 | End: 2023-10-19

## 2023-09-05 RX ORDER — OLMESARTAN MEDOXOMIL 40 MG/1
40 TABLET ORAL DAILY
Qty: 30 TABLET | Refills: 0 | Status: SHIPPED | OUTPATIENT
Start: 2023-09-05 | End: 2023-10-19

## 2023-09-05 RX ORDER — ROSUVASTATIN CALCIUM 40 MG/1
40 TABLET, COATED ORAL
Qty: 30 TABLET | Refills: 0 | Status: SHIPPED | OUTPATIENT
Start: 2023-09-05 | End: 2024-04-19

## 2023-09-18 ENCOUNTER — TELEPHONE (OUTPATIENT)
Dept: FAMILY MEDICINE | Facility: CLINIC | Age: 63
End: 2023-09-18
Payer: COMMERCIAL

## 2023-09-18 NOTE — TELEPHONE ENCOUNTER
[facility] Appointment Request   Provider: Dr. Butt  Appointment Type: SDS  Reason for Visit: L swollen, red eye  Available Day and Time: Any  Best Contact Number: [phone]    The practice will reach out to schedule your appointment within the next 2 business days.

## 2023-09-19 ENCOUNTER — OFFICE VISIT (OUTPATIENT)
Dept: FAMILY MEDICINE | Facility: CLINIC | Age: 63
End: 2023-09-19
Payer: COMMERCIAL

## 2023-09-19 VITALS
WEIGHT: 210.2 LBS | HEIGHT: 71 IN | DIASTOLIC BLOOD PRESSURE: 90 MMHG | BODY MASS INDEX: 29.43 KG/M2 | HEART RATE: 68 BPM | OXYGEN SATURATION: 99 % | SYSTOLIC BLOOD PRESSURE: 150 MMHG | RESPIRATION RATE: 16 BRPM

## 2023-09-19 DIAGNOSIS — E78.00 PURE HYPERCHOLESTEROLEMIA: ICD-10-CM

## 2023-09-19 DIAGNOSIS — H02.846 SWELLING OF GLAND OF LEFT EYELID: Primary | ICD-10-CM

## 2023-09-19 DIAGNOSIS — I10 BENIGN ESSENTIAL HYPERTENSION: ICD-10-CM

## 2023-09-19 PROCEDURE — 3080F DIAST BP >= 90 MM HG: CPT | Performed by: FAMILY MEDICINE

## 2023-09-19 PROCEDURE — 3077F SYST BP >= 140 MM HG: CPT | Performed by: FAMILY MEDICINE

## 2023-09-19 PROCEDURE — 99214 OFFICE O/P EST MOD 30 MIN: CPT | Performed by: FAMILY MEDICINE

## 2023-09-19 PROCEDURE — 3008F BODY MASS INDEX DOCD: CPT | Performed by: FAMILY MEDICINE

## 2023-09-19 NOTE — PROGRESS NOTES
Subjective      Patient ID: Braeden Paige is a 62 y.o. male.    HPI patient is here to follow-up on chronic conditions.  As well as for left eyelid swelling.  He noticed over the past few days his left upper eyelid was slightly swollen.  It was slightly itchy at one time.  He did not have discharge or any visual changes.  He did also have a case of poison ivy in other areas of his body around the same time.  Patient has a history of hypertension high cholesterol taking his medications as prescribed, recent cholesterol readings were actually a little higher than previous despite being on high-dose statin therapy.     Tobacco  Allergies  Meds  Problems  Med Hx  Surg Hx  Fam Hx       Review of Systems  Allergies   Allergen Reactions    Ace Inhibitors      Other reaction(s): cough     Current Outpatient Medications   Medication Sig Dispense Refill    amLODIPine (NORVASC) 5 mg tablet Take 1 tablet (5 mg total) by mouth daily. 30 tablet 0    aspirin 81 mg chewable tablet Take 81 mg by mouth daily.      nystatin-triamcinolone (MYCOLOG II) ointment       olmesartan (BENICAR) 40 mg tablet Take 1 tablet (40 mg total) by mouth daily. 30 tablet 0    rosuvastatin (CRESTOR) 40 mg tablet Take 1 tablet (40 mg total) by mouth once daily. 30 tablet 0    sildenafiL (VIAGRA) 100 mg tablet Take 1 tablet (100 mg total) by mouth daily as needed for erectile dysfunction. 10 tablet 3     No current facility-administered medications for this visit.     Past Medical History:   Diagnosis Date    Hypercholesteremia     Hypertension      Past Surgical History:   Procedure Laterality Date    TONSILLECTOMY       Social History     Tobacco Use    Smoking status: Some Days     Types: Cigars    Smokeless tobacco: Never   Substance Use Topics    Alcohol use: Yes     Comment: occationally    Drug use: No     History reviewed. No pertinent family history.    Objective   Vitals:    09/19/23 1026 09/19/23 1034   BP: (!) 160/98 (!)  "150/90   Pulse: 68    Resp: 16    SpO2: 99%    Weight: 95.3 kg (210 lb 3.2 oz)    Height: 1.803 m (5' 11\")     Body mass index is 29.32 kg/m².  Physical Exam  Constitutional:       General: He is not in acute distress.     Appearance: He is well-developed.   Eyes:      Extraocular Movements: Extraocular movements intact.      Conjunctiva/sclera: Conjunctivae normal.      Pupils: Pupils are equal, round, and reactive to light.      Comments: Very slight swelling of the left upper eyelid, no stye present no significant redness.   Neurological:      Mental Status: He is alert and oriented to person, place, and time.   Psychiatric:         Speech: Speech normal.         Behavior: Behavior normal.         Assessment/Plan   Problem List Items Addressed This Visit     Benign essential hypertension    Pure hypercholesterolemia   Other Visit Diagnoses     Swelling of gland of left eyelid    -  Primary      Patient's blood pressure has previously been well controlled, no changes medication today.  For his elevated cholesterol patient adamantly wants to change his diet to see if he can lower this along with his Crestor and we can recheck blood work in the next 2 to 3 months.  Patient's eyelid swelling is likely secondary to poison ivy, does not look infected at this time therefore I recommended cool compresses and supportive treatment.  If any worsening or redness occurs in the next few days he is to contact me.    Patient agrees and verbalizes understanding of medication and treatment plan discussed at today's visit.  Patient was instructed to call or follow-up if symptoms persist or worsen.         Schuyler Butt, DO    This note was created with voice recognition software. Inadvertent dictation errors should be disregarded. Please contact my office for clarification.  "

## 2024-03-01 RX ORDER — OLMESARTAN MEDOXOMIL 40 MG/1
40 TABLET ORAL DAILY
Qty: 90 TABLET | Refills: 1 | Status: SHIPPED
Start: 2024-03-01 | End: 2024-09-16

## 2024-04-17 ENCOUNTER — APPOINTMENT (OUTPATIENT)
Dept: URBAN - METROPOLITAN AREA CLINIC 203 | Age: 64
Setting detail: DERMATOLOGY
End: 2024-04-22

## 2024-04-17 DIAGNOSIS — L82.0 INFLAMED SEBORRHEIC KERATOSIS: ICD-10-CM

## 2024-04-17 DIAGNOSIS — L81.4 OTHER MELANIN HYPERPIGMENTATION: ICD-10-CM

## 2024-04-17 PROCEDURE — 17110 DESTRUCT B9 LESION 1-14: CPT

## 2024-04-17 PROCEDURE — OTHER REASSURANCE: OTHER

## 2024-04-17 PROCEDURE — OTHER LIQUID NITROGEN: OTHER

## 2024-04-17 PROCEDURE — 99212 OFFICE O/P EST SF 10 MIN: CPT | Mod: 25

## 2024-04-17 ASSESSMENT — LOCATION SIMPLE DESCRIPTION DERM
LOCATION SIMPLE: SCALP
LOCATION SIMPLE: RIGHT FOREHEAD

## 2024-04-17 ASSESSMENT — LOCATION ZONE DERM
LOCATION ZONE: FACE
LOCATION ZONE: SCALP

## 2024-04-17 ASSESSMENT — LOCATION DETAILED DESCRIPTION DERM
LOCATION DETAILED: RIGHT FOREHEAD
LOCATION DETAILED: RIGHT LATERAL FOREHEAD
LOCATION DETAILED: RIGHT SUPERIOR FRONTAL SCALP
LOCATION DETAILED: LEFT INFERIOR POSTAURICULAR SKIN

## 2024-04-17 NOTE — PROCEDURE: LIQUID NITROGEN
Render Post-Care Instructions In Note?: no
Spray Paint Text: The liquid nitrogen was applied to the skin utilizing a spray paint frosting technique.
Post-Care Instructions: I reviewed with the patient in detail post-care instructions. Patient is to wear sunprotection, and avoid picking at any of the treated lesions. Pt may apply Vaseline to crusted or scabbing areas.
Number Of Freeze-Thaw Cycles: 3 freeze-thaw cycles
Show Applicator Variable?: Yes
Medical Necessity Clause: This procedure was medically necessary because the lesions that were treated were:
Pared With?: 15 blade
Detail Level: Detailed
Consent: The patient's consent was obtained including but not limited to risks of crusting, scabbing, blistering, scarring, darker or lighter pigmentary change, recurrence, incomplete removal and infection.
Medical Necessity Information: It is in your best interest to select a reason for this procedure from the list below. All of these items fulfill various CMS LCD requirements except the new and changing color options.

## 2024-04-19 RX ORDER — ROSUVASTATIN CALCIUM 40 MG/1
40 TABLET, COATED ORAL
Qty: 90 TABLET | Refills: 1 | Status: SHIPPED | OUTPATIENT
Start: 2024-04-19 | End: 2024-10-17

## 2024-04-19 NOTE — TELEPHONE ENCOUNTER
Medicine Refill Request    Last Office Visit: 9/19/2023   Last Consult Visit: Visit date not found  Last Telemedicine Visit: 11/12/2021 Schuyler Butt, DO    Next Appointment: Visit date not found      Current Outpatient Medications:     amLODIPine (NORVASC) 5 mg tablet, TAKE ONE TABLET BY MOUTH ONE TIME DAILY, Disp: 90 tablet, Rfl: 3    aspirin 81 mg chewable tablet, Take 81 mg by mouth daily., Disp: , Rfl:     nystatin-triamcinolone (MYCOLOG II) ointment, , Disp: , Rfl:     olmesartan (BENICAR) 40 mg tablet, Take 1 tablet (40 mg total) by mouth daily., Disp: 90 tablet, Rfl: 1    rosuvastatin (CRESTOR) 40 mg tablet, Take 1 tablet (40 mg total) by mouth once daily., Disp: 30 tablet, Rfl: 0    sildenafiL (VIAGRA) 100 mg tablet, Take 1 tablet (100 mg total) by mouth daily as needed for erectile dysfunction., Disp: 10 tablet, Rfl: 3      BP Readings from Last 3 Encounters:   09/19/23 (!) 150/90   07/27/23 136/90   04/04/23 126/70       Recent Lab results:  Lab Results   Component Value Date    CHOL 256 (H) 07/27/2023   ,   Lab Results   Component Value Date    HDL 57 07/27/2023   ,   Lab Results   Component Value Date    LDLCALC 184 (H) 07/27/2023   ,   Lab Results   Component Value Date    TRIG 90 07/27/2023        Lab Results   Component Value Date    GLUCOSE 110 (H) 07/27/2023   ,   Lab Results   Component Value Date    HGBA1C 5.8 (H) 07/27/2023         Lab Results   Component Value Date    CREATININE 0.76 07/27/2023       Lab Results   Component Value Date    TSH 1.390 11/05/2015           Lab Results   Component Value Date    HGBA1C 5.8 (H) 07/27/2023

## 2024-08-06 ENCOUNTER — APPOINTMENT (OUTPATIENT)
Dept: URBAN - METROPOLITAN AREA CLINIC 203 | Age: 64
Setting detail: DERMATOLOGY
End: 2024-08-08

## 2024-08-06 DIAGNOSIS — L82.0 INFLAMED SEBORRHEIC KERATOSIS: ICD-10-CM

## 2024-08-06 PROCEDURE — OTHER LIQUID NITROGEN: OTHER

## 2024-08-06 PROCEDURE — 17110 DESTRUCT B9 LESION 1-14: CPT

## 2024-08-06 ASSESSMENT — LOCATION SIMPLE DESCRIPTION DERM: LOCATION SIMPLE: RIGHT FOREHEAD

## 2024-08-06 ASSESSMENT — LOCATION ZONE DERM: LOCATION ZONE: FACE

## 2024-08-06 ASSESSMENT — LOCATION DETAILED DESCRIPTION DERM: LOCATION DETAILED: RIGHT LATERAL FOREHEAD

## 2024-08-06 NOTE — PROCEDURE: LIQUID NITROGEN
Show Applicator Variable?: Yes
Spray Paint Text: The liquid nitrogen was applied to the skin utilizing a spray paint frosting technique.
Add 52 Modifier (Optional): no
Post-Care Instructions: I reviewed with the patient in detail post-care instructions. Patient is to wear sunprotection, and avoid picking at any of the treated lesions. Pt may apply Vaseline to crusted or scabbing areas.
Consent: The patient's consent was obtained including but not limited to risks of crusting, scabbing, blistering, scarring, darker or lighter pigmentary change, recurrence, incomplete removal and infection.
Number Of Freeze-Thaw Cycles: 3 freeze-thaw cycles
Medical Necessity Information: It is in your best interest to select a reason for this procedure from the list below. All of these items fulfill various CMS LCD requirements except the new and changing color options.
Detail Level: Detailed
Medical Necessity Clause: This procedure was medically necessary because the lesions that were treated were:

## 2024-09-16 RX ORDER — OLMESARTAN MEDOXOMIL 40 MG/1
40 TABLET ORAL DAILY
Qty: 90 TABLET | Refills: 0 | Status: SHIPPED | OUTPATIENT
Start: 2024-09-16 | End: 2024-12-12

## 2024-09-16 NOTE — TELEPHONE ENCOUNTER
Medicine Refill Request    Last Office Visit: 9/19/2023   Last Consult Visit: Visit date not found  Last Telemedicine Visit: 11/12/2021 Schuyler Butt, DO    Next Appointment: Visit date not found      Current Outpatient Medications:     amLODIPine (NORVASC) 5 mg tablet, TAKE ONE TABLET BY MOUTH ONE TIME DAILY, Disp: 90 tablet, Rfl: 3    aspirin 81 mg chewable tablet, Take 81 mg by mouth daily., Disp: , Rfl:     nystatin-triamcinolone (MYCOLOG II) ointment, , Disp: , Rfl:     olmesartan (BENICAR) 40 mg tablet, Take 1 tablet (40 mg total) by mouth daily., Disp: 90 tablet, Rfl: 1    rosuvastatin (CRESTOR) 40 mg tablet, Take 1 tablet (40 mg total) by mouth once daily., Disp: 90 tablet, Rfl: 1    sildenafiL (VIAGRA) 100 mg tablet, Take 1 tablet (100 mg total) by mouth daily as needed for erectile dysfunction., Disp: 10 tablet, Rfl: 3      BP Readings from Last 3 Encounters:   09/19/23 (!) 150/90   07/27/23 136/90   04/04/23 126/70       Recent Lab results:  Lab Results   Component Value Date    CHOL 256 (H) 07/27/2023   ,   Lab Results   Component Value Date    HDL 57 07/27/2023   ,   Lab Results   Component Value Date    LDLCALC 184 (H) 07/27/2023   ,   Lab Results   Component Value Date    TRIG 90 07/27/2023        Lab Results   Component Value Date    GLUCOSE 110 (H) 07/27/2023   ,   Lab Results   Component Value Date    HGBA1C 5.8 (H) 07/27/2023         Lab Results   Component Value Date    CREATININE 0.76 07/27/2023       Lab Results   Component Value Date    TSH 1.390 11/05/2015           Lab Results   Component Value Date    HGBA1C 5.8 (H) 07/27/2023

## 2024-09-23 ENCOUNTER — APPOINTMENT (OUTPATIENT)
Dept: URBAN - METROPOLITAN AREA CLINIC 203 | Age: 64
Setting detail: DERMATOLOGY
End: 2024-09-26

## 2024-09-23 DIAGNOSIS — L57.8 OTHER SKIN CHANGES DUE TO CHRONIC EXPOSURE TO NONIONIZING RADIATION: ICD-10-CM

## 2024-09-23 DIAGNOSIS — D22 MELANOCYTIC NEVI: ICD-10-CM

## 2024-09-23 DIAGNOSIS — L82.0 INFLAMED SEBORRHEIC KERATOSIS: ICD-10-CM

## 2024-09-23 DIAGNOSIS — L20.89 OTHER ATOPIC DERMATITIS: ICD-10-CM

## 2024-09-23 PROBLEM — D22.4 MELANOCYTIC NEVI OF SCALP AND NECK: Status: ACTIVE | Noted: 2024-09-23

## 2024-09-23 PROCEDURE — OTHER COUNSELING: OTHER

## 2024-09-23 PROCEDURE — 99213 OFFICE O/P EST LOW 20 MIN: CPT | Mod: 25

## 2024-09-23 PROCEDURE — OTHER PRESCRIPTION: OTHER

## 2024-09-23 PROCEDURE — OTHER PRESCRIPTION MEDICATION MANAGEMENT: OTHER

## 2024-09-23 PROCEDURE — 17110 DESTRUCT B9 LESION 1-14: CPT

## 2024-09-23 PROCEDURE — OTHER SUNSCREEN RECOMMENDATIONS: OTHER

## 2024-09-23 PROCEDURE — OTHER REASSURANCE: OTHER

## 2024-09-23 PROCEDURE — OTHER LIQUID NITROGEN: OTHER

## 2024-09-23 RX ORDER — TRIAMCINOLONE ACETONIDE 1 MG/G
CREAM TOPICAL BID PRN
Qty: 45 | Refills: 4 | Status: ERX | COMMUNITY
Start: 2024-09-23

## 2024-09-23 ASSESSMENT — LOCATION SIMPLE DESCRIPTION DERM
LOCATION SIMPLE: LEFT POPLITEAL SKIN
LOCATION SIMPLE: RIGHT TEMPLE
LOCATION SIMPLE: RIGHT FOREARM
LOCATION SIMPLE: LEFT ELBOW
LOCATION SIMPLE: CHEST
LOCATION SIMPLE: LEFT KNEE
LOCATION SIMPLE: RIGHT FOREHEAD
LOCATION SIMPLE: LEFT UPPER BACK
LOCATION SIMPLE: RIGHT POPLITEAL SKIN
LOCATION SIMPLE: RIGHT SCALP
LOCATION SIMPLE: RIGHT KNEE
LOCATION SIMPLE: RIGHT LOWER BACK

## 2024-09-23 ASSESSMENT — LOCATION DETAILED DESCRIPTION DERM
LOCATION DETAILED: RIGHT VENTRAL PROXIMAL FOREARM
LOCATION DETAILED: LEFT POPLITEAL SKIN
LOCATION DETAILED: LEFT ANTECUBITAL SKIN
LOCATION DETAILED: RIGHT SUPERIOR LATERAL FOREHEAD
LOCATION DETAILED: RIGHT MEDIAL FRONTAL SCALP
LOCATION DETAILED: RIGHT SUPERIOR TEMPLE
LOCATION DETAILED: RIGHT SUPERIOR MEDIAL LOWER BACK
LOCATION DETAILED: LEFT MEDIAL INFERIOR CHEST
LOCATION DETAILED: LEFT SUPERIOR UPPER BACK
LOCATION DETAILED: RIGHT POPLITEAL SKIN
LOCATION DETAILED: RIGHT KNEE
LOCATION DETAILED: LEFT KNEE

## 2024-09-23 ASSESSMENT — LOCATION ZONE DERM
LOCATION ZONE: ARM
LOCATION ZONE: TRUNK
LOCATION ZONE: FACE
LOCATION ZONE: LEG
LOCATION ZONE: SCALP

## 2024-10-07 NOTE — TELEPHONE ENCOUNTER
Medicine Refill Request    Last Office Visit: 9/19/2023   Last Consult Visit: Visit date not found  Last Telemedicine Visit: 11/12/2021 Schuyler Butt, DO    Next Appointment: Visit date not found      Current Outpatient Medications:     amLODIPine (NORVASC) 5 mg tablet, TAKE ONE TABLET BY MOUTH ONE TIME DAILY, Disp: 90 tablet, Rfl: 3    aspirin 81 mg chewable tablet, Take 81 mg by mouth daily., Disp: , Rfl:     nystatin-triamcinolone (MYCOLOG II) ointment, , Disp: , Rfl:     olmesartan (BENICAR) 40 mg tablet, TAKE 1 TABLET (40 MG TOTAL) BY MOUTH DAILY., Disp: 90 tablet, Rfl: 0    rosuvastatin (CRESTOR) 40 mg tablet, Take 1 tablet (40 mg total) by mouth once daily., Disp: 90 tablet, Rfl: 1    sildenafiL (VIAGRA) 100 mg tablet, Take 1 tablet (100 mg total) by mouth daily as needed for erectile dysfunction., Disp: 10 tablet, Rfl: 3      BP Readings from Last 3 Encounters:   09/19/23 (!) 150/90   07/27/23 136/90   04/04/23 126/70       Recent Lab results:  Lab Results   Component Value Date    CHOL 256 (H) 07/27/2023   ,   Lab Results   Component Value Date    HDL 57 07/27/2023   ,   Lab Results   Component Value Date    LDLCALC 184 (H) 07/27/2023   ,   Lab Results   Component Value Date    TRIG 90 07/27/2023        Lab Results   Component Value Date    GLUCOSE 110 (H) 07/27/2023   ,   Lab Results   Component Value Date    HGBA1C 5.8 (H) 07/27/2023         Lab Results   Component Value Date    CREATININE 0.76 07/27/2023       Lab Results   Component Value Date    TSH 1.390 11/05/2015           Lab Results   Component Value Date    HGBA1C 5.8 (H) 07/27/2023

## 2024-10-08 RX ORDER — AMLODIPINE BESYLATE 5 MG/1
5 TABLET ORAL DAILY
Qty: 90 TABLET | Refills: 0 | Status: SHIPPED | OUTPATIENT
Start: 2024-10-08 | End: 2025-01-02

## 2024-10-17 RX ORDER — ROSUVASTATIN CALCIUM 40 MG/1
40 TABLET, COATED ORAL
Qty: 90 TABLET | Refills: 3 | Status: SHIPPED | OUTPATIENT
Start: 2024-10-17

## 2024-10-17 NOTE — TELEPHONE ENCOUNTER
Medicine Refill Request    Last Office Visit: 9/19/2023   Last Consult Visit: Visit date not found  Last Telemedicine Visit: 11/12/2021 Schuyler Butt, DO    Next Appointment: 12/27/2024      Current Outpatient Medications:     amLODIPine (NORVASC) 5 mg tablet, TAKE ONE TABLET BY MOUTH ONE TIME DAILY, Disp: 90 tablet, Rfl: 0    aspirin 81 mg chewable tablet, Take 81 mg by mouth daily., Disp: , Rfl:     nystatin-triamcinolone (MYCOLOG II) ointment, , Disp: , Rfl:     olmesartan (BENICAR) 40 mg tablet, TAKE 1 TABLET (40 MG TOTAL) BY MOUTH DAILY., Disp: 90 tablet, Rfl: 0    rosuvastatin (CRESTOR) 40 mg tablet, Take 1 tablet (40 mg total) by mouth once daily., Disp: 90 tablet, Rfl: 1    sildenafiL (VIAGRA) 100 mg tablet, Take 1 tablet (100 mg total) by mouth daily as needed for erectile dysfunction., Disp: 10 tablet, Rfl: 3      BP Readings from Last 3 Encounters:   09/19/23 (!) 150/90   07/27/23 136/90   04/04/23 126/70       Recent Lab results:  Lab Results   Component Value Date    CHOL 256 (H) 07/27/2023   ,   Lab Results   Component Value Date    HDL 57 07/27/2023   ,   Lab Results   Component Value Date    LDLCALC 184 (H) 07/27/2023   ,   Lab Results   Component Value Date    TRIG 90 07/27/2023        Lab Results   Component Value Date    GLUCOSE 110 (H) 07/27/2023   ,   Lab Results   Component Value Date    HGBA1C 5.8 (H) 07/27/2023         Lab Results   Component Value Date    CREATININE 0.76 07/27/2023       Lab Results   Component Value Date    TSH 1.390 11/05/2015           Lab Results   Component Value Date    HGBA1C 5.8 (H) 07/27/2023

## 2024-12-12 RX ORDER — OLMESARTAN MEDOXOMIL 40 MG/1
40 TABLET ORAL DAILY
Qty: 90 TABLET | Refills: 3 | Status: SHIPPED | OUTPATIENT
Start: 2024-12-12 | End: 2025-12-07

## 2024-12-12 NOTE — TELEPHONE ENCOUNTER
Medicine Refill Request    Last Office Visit: 9/19/2023   Last Consult Visit: Visit date not found  Last Telemedicine Visit: 11/12/2021 Schuyler Butt, DO    Next Appointment: 12/27/2024      Current Outpatient Medications:     amLODIPine (NORVASC) 5 mg tablet, TAKE ONE TABLET BY MOUTH ONE TIME DAILY, Disp: 90 tablet, Rfl: 0    aspirin 81 mg chewable tablet, Take 81 mg by mouth daily., Disp: , Rfl:     nystatin-triamcinolone (MYCOLOG II) ointment, , Disp: , Rfl:     olmesartan (BENICAR) 40 mg tablet, TAKE 1 TABLET (40 MG TOTAL) BY MOUTH DAILY., Disp: 90 tablet, Rfl: 0    rosuvastatin (CRESTOR) 40 mg tablet, Take 1 tablet (40 mg total) by mouth once daily., Disp: 90 tablet, Rfl: 3    sildenafiL (VIAGRA) 100 mg tablet, Take 1 tablet (100 mg total) by mouth daily as needed for erectile dysfunction., Disp: 10 tablet, Rfl: 3      BP Readings from Last 3 Encounters:   09/19/23 (!) 150/90   07/27/23 136/90   04/04/23 126/70       Recent Lab results:  Lab Results   Component Value Date    CHOL 256 (H) 07/27/2023   ,   Lab Results   Component Value Date    HDL 57 07/27/2023   ,   Lab Results   Component Value Date    LDLCALC 184 (H) 07/27/2023   ,   Lab Results   Component Value Date    TRIG 90 07/27/2023        Lab Results   Component Value Date    GLUCOSE 110 (H) 07/27/2023   ,   Lab Results   Component Value Date    HGBA1C 5.8 (H) 07/27/2023         Lab Results   Component Value Date    CREATININE 0.76 07/27/2023       Lab Results   Component Value Date    TSH 1.390 11/05/2015           Lab Results   Component Value Date    HGBA1C 5.8 (H) 07/27/2023

## 2025-01-02 RX ORDER — AMLODIPINE BESYLATE 5 MG/1
5 TABLET ORAL DAILY
Qty: 90 TABLET | Refills: 0 | Status: SHIPPED | OUTPATIENT
Start: 2025-01-02 | End: 2025-03-31

## 2025-01-02 NOTE — TELEPHONE ENCOUNTER
Medicine Refill Request    Last Office Visit: 9/19/2023   Last Consult Visit: Visit date not found  Last Telemedicine Visit: 11/12/2021 Schuyler Butt, DO    Next Appointment: 2/13/2025      Current Outpatient Medications:     amLODIPine (NORVASC) 5 mg tablet, TAKE ONE TABLET BY MOUTH ONE TIME DAILY, Disp: 90 tablet, Rfl: 0    aspirin 81 mg chewable tablet, Take 81 mg by mouth daily., Disp: , Rfl:     nystatin-triamcinolone (MYCOLOG II) ointment, , Disp: , Rfl:     olmesartan (BENICAR) 40 mg tablet, TAKE 1 TABLET (40 MG TOTAL) BY MOUTH DAILY., Disp: 90 tablet, Rfl: 3    rosuvastatin (CRESTOR) 40 mg tablet, Take 1 tablet (40 mg total) by mouth once daily., Disp: 90 tablet, Rfl: 3    sildenafiL (VIAGRA) 100 mg tablet, Take 1 tablet (100 mg total) by mouth daily as needed for erectile dysfunction., Disp: 10 tablet, Rfl: 3      BP Readings from Last 3 Encounters:   09/19/23 (!) 150/90   07/27/23 136/90   04/04/23 126/70       Recent Lab results:  Lab Results   Component Value Date    CHOL 256 (H) 07/27/2023   ,   Lab Results   Component Value Date    HDL 57 07/27/2023   ,   Lab Results   Component Value Date    LDLCALC 184 (H) 07/27/2023   ,   Lab Results   Component Value Date    TRIG 90 07/27/2023        Lab Results   Component Value Date    GLUCOSE 110 (H) 07/27/2023   ,   Lab Results   Component Value Date    HGBA1C 5.8 (H) 07/27/2023         Lab Results   Component Value Date    CREATININE 0.76 07/27/2023       Lab Results   Component Value Date    TSH 1.390 11/05/2015           Lab Results   Component Value Date    HGBA1C 5.8 (H) 07/27/2023

## 2025-02-06 ENCOUNTER — OFFICE VISIT (OUTPATIENT)
Dept: FAMILY MEDICINE | Facility: CLINIC | Age: 65
End: 2025-02-06
Payer: MEDICARE

## 2025-02-06 VITALS
SYSTOLIC BLOOD PRESSURE: 118 MMHG | BODY MASS INDEX: 28.88 KG/M2 | HEART RATE: 58 BPM | OXYGEN SATURATION: 98 % | TEMPERATURE: 98.2 F | DIASTOLIC BLOOD PRESSURE: 82 MMHG | RESPIRATION RATE: 14 BRPM | HEIGHT: 72 IN | WEIGHT: 213.2 LBS

## 2025-02-06 DIAGNOSIS — Z13.6 SCREENING FOR AAA (ABDOMINAL AORTIC ANEURYSM): ICD-10-CM

## 2025-02-06 DIAGNOSIS — Z12.5 SCREENING FOR PROSTATE CANCER: ICD-10-CM

## 2025-02-06 DIAGNOSIS — Z00.00 MEDICARE ANNUAL WELLNESS VISIT, INITIAL: Primary | ICD-10-CM

## 2025-02-06 DIAGNOSIS — R73.9 HYPERGLYCEMIA: ICD-10-CM

## 2025-02-06 DIAGNOSIS — E78.00 PURE HYPERCHOLESTEROLEMIA: ICD-10-CM

## 2025-02-06 DIAGNOSIS — I10 BENIGN ESSENTIAL HYPERTENSION: ICD-10-CM

## 2025-02-06 DIAGNOSIS — Z00.00 ROUTINE MEDICAL EXAM: ICD-10-CM

## 2025-02-06 DIAGNOSIS — D12.6 ADENOMATOUS POLYP OF COLON, UNSPECIFIED PART OF COLON: ICD-10-CM

## 2025-02-06 PROCEDURE — G0438 PPPS, INITIAL VISIT: HCPCS | Performed by: FAMILY MEDICINE

## 2025-02-06 PROCEDURE — G0009 ADMIN PNEUMOCOCCAL VACCINE: HCPCS | Performed by: FAMILY MEDICINE

## 2025-02-06 PROCEDURE — 90677 PCV20 VACCINE IM: CPT | Performed by: FAMILY MEDICINE

## 2025-02-06 SDOH — ECONOMIC STABILITY: FOOD INSECURITY: WITHIN THE PAST 12 MONTHS, YOU WORRIED THAT YOUR FOOD WOULD RUN OUT BEFORE YOU GOT MONEY TO BUY MORE.: NEVER TRUE

## 2025-02-06 SDOH — ECONOMIC STABILITY: INCOME INSECURITY: IN THE LAST 12 MONTHS, WAS THERE A TIME WHEN YOU WERE NOT ABLE TO PAY THE MORTGAGE OR RENT ON TIME?: NO

## 2025-02-06 SDOH — ECONOMIC STABILITY: FOOD INSECURITY: WITHIN THE PAST 12 MONTHS, THE FOOD YOU BOUGHT JUST DIDN'T LAST AND YOU DIDN'T HAVE MONEY TO GET MORE.: NEVER TRUE

## 2025-02-06 SDOH — ECONOMIC STABILITY: TRANSPORTATION INSECURITY
IN THE PAST 12 MONTHS, HAS LACK OF TRANSPORTATION KEPT YOU FROM MEETINGS, WORK, OR FROM GETTING THINGS NEEDED FOR DAILY LIVING?: NO

## 2025-02-06 SDOH — ECONOMIC STABILITY: TRANSPORTATION INSECURITY
IN THE PAST 12 MONTHS, HAS THE LACK OF TRANSPORTATION KEPT YOU FROM MEDICAL APPOINTMENTS OR FROM GETTING MEDICATIONS?: NO

## 2025-02-06 ASSESSMENT — SOCIAL DETERMINANTS OF HEALTH (SDOH): IN THE PAST 12 MONTHS, HAS THE ELECTRIC, GAS, OIL, OR WATER COMPANY THREATENED TO SHUT OFF SERVICE IN YOUR HOME?: NO

## 2025-02-06 NOTE — PROGRESS NOTES
Subjective patient is here for exam history and exercise routinely feels well and has no concerns.    Braeden Paige is a 64 y.o. male who presents for an initial annual wellness visit.     Question 2/6/2025  7:42 AM EST - Filed by Patient   During the past 4 weeks, how much bodily pain have you generally had? No Pain   During the past 4 weeks, was someone  available to help you if you needed and wanted help? For example, if you felt very nervous, lonely or blue, got sick  and had to stay in bed, needed someone to talk to, needed help with daily chores, or needed help just taking care of  yourself? Yes, as much as I wanted   During the past 4 weeks, what was the hardest physical activity you could do for at least 2 minutes? Light   During the past 4 weeks, how would you rate your health in general? Excellent   Can you get places out of walking distance without help? For example, can you travel by bus, taxi, or drive your own car? Yes   Can you shop for groceries or clothes without help? Yes   Can you prepare your own meals? Yes   Can you do your own housework without help? Yes   Can you handle your own money without help? Yes   Do you need help eating, bathing, dressing, or getting around your home? No   How have things been going for you during the past 4 weeks? Pretty good   Are you having difficulties driving your car? No   Do you always fasten your seatbelt when you are in a car? Yes, usually   How often during the past 4 weeks have you experienced falling or dizziness when standing up? Never   How often during the past 4 weeks have you experienced sexual problems? Seldom   How often during the past 4 weeks have you experienced trouble eating well? Never   How often during the past 4 weeks have you experienced teeth or denture problems? Never   How often during the past 4 weeks have you experienced problems using the telephone? Never   How often during the past 4 weeks have you experienced problems being tired  or fatigue? Sometimes   Have you fallen 2 or more times in the past year? No   Are you afraid of falling? No   Are you a smoker? No   During the past 4  weeks, how many drinks of wine, beer, or other alcoholic beverages did you have? 2-5 per week   Do you exercise for about 20 minutes 3 or more days per week? Yes, some of the time   Have you been given any information to help you with the following? Hazards in your house that might hurt you? No   Have you been given any information to help you with the following? Keeping tracking of your medications? No   How often do you have trouble taking medicines the way you have been told to take them? I always take them as prescribed   How confident are you that you can control and manage most of your health problems? Very confident   Do you have diffculty with your hearing? No   How old are you? 65-69 years   Are you male or female? Male       Patient Care Team:  Schuyler Butt DO as PCP - General    Comprehensive Medical and Social History  Patient Active Problem List   Diagnosis    Benign essential hypertension    Pure hypercholesterolemia    Hyperglycemia    Adenomatous polyp of colon     Past Medical History:   Diagnosis Date    Hypercholesteremia     Hypertension      Past Surgical History   Procedure Laterality Date    Tonsillectomy       Allergies   Allergen Reactions    Ace Inhibitors      Other reaction(s): cough     Current Outpatient Medications   Medication Sig Dispense Refill    amLODIPine (NORVASC) 5 mg tablet TAKE ONE TABLET BY MOUTH ONE TIME DAILY 90 tablet 0    aspirin 81 mg chewable tablet Take 81 mg by mouth daily.      nystatin-triamcinolone (MYCOLOG II) ointment       olmesartan (BENICAR) 40 mg tablet TAKE 1 TABLET (40 MG TOTAL) BY MOUTH DAILY. 90 tablet 3    rosuvastatin (CRESTOR) 40 mg tablet Take 1 tablet (40 mg total) by mouth once daily. 90 tablet 3    sildenafiL (VIAGRA) 100 mg tablet Take 1 tablet (100 mg total) by mouth daily as needed for  erectile dysfunction. 10 tablet 3     No current facility-administered medications for this visit.     Social History     Tobacco Use    Smoking status: Some Days     Types: Cigars    Smokeless tobacco: Never   Substance Use Topics    Alcohol use: Yes     Comment: occationally    Drug use: No     No family history on file.    Objective   Vitals  Vitals:    02/06/25 0915   BP: 118/82   BP Location: Left upper arm   Patient Position: Sitting   Pulse: (!) 58   Resp: 14   Temp: 36.8 °C (98.2 °F)   TempSrc: Temporal   SpO2: 98%   Weight: 96.7 kg (213 lb 3.2 oz)   Height: 1.829 m (6')     Body mass index is 28.92 kg/m².    Advanced Care Plan                                        PHQ                                                            Mini Cog     Patient deferred    Get Up and Go       STEADI Falls Risk                                                                Hearing and Vision Screening  No results found.  See HRA for relevant hearing screening response.    Diet and Exercise            Assessment/Plan   Diagnoses and all orders for this visit:    Medicare annual wellness visit, initial (Primary)    Benign essential hypertension    Pure hypercholesterolemia    Hyperglycemia  -     Hemoglobin A1c    Adenomatous polyp of colon, unspecified part of colon    Routine medical exam  -     CBC  -     Comprehensive metabolic panel  -     Lipid panel  -     Urinalysis with microscopic    Screening for prostate cancer  -     PSA    Screening for AAA (abdominal aortic aneurysm)  -     ULTRASOUND AAA SCREENING; Future    Check AAA screening, check blood work today for routine screening and monitoring, chronic conditions are stable continue current medications routine exercise and healthy diet.  All other screenings up-to-date.    See Patient Instructions (the written plan) which was given to the patient for PPPS and health risk factors with interventions.

## 2025-02-06 NOTE — PATIENT INSTRUCTIONS
Your Personalized Prevention Plan Services (PPPS)    Preventive Services Checklist (Assumes Average Risk Unless Otherwise Noted):    Health Maintenance Topics with due status: Overdue       Topic Date Due    Pneumococcal (65 years and older) Never done    Depression Screening Never done    Medicare Annual Wellness Visit Never done    Abdominal Aortic Aneurysm (AAA) Screen Never done    Influenza Vaccine 08/01/2024    COVID-19 Vaccine 09/01/2024     Health Maintenance Topics with due status: Not Due       Topic Last Completion Date    DTaP, Tdap, and Td Vaccines 08/04/2018    Colorectal Cancer Screening 05/18/2024    Falls Risk Screening 02/06/2025    RSV Vaccine Not Due     Health Maintenance Topics with due status: Completed       Topic Last Completion Date    Hepatitis C Screening 12/28/2017     Health Maintenance Topics with due status: Addressed       Topic Date Due    Zoster Vaccine Addressed     Health Maintenance Topics with due status: Aged Out       Topic Date Due    Meningococcal ACWY Aged Out    RSV <20 months Aged Out    HIB Vaccines Aged Out    Hepatitis B Vaccines Aged Out    IPV Vaccines Aged Out    Meningococcal B Aged Out    HPV Vaccines Aged Out       You May Be Eligible for These Additional Preventive Services   (Assumes Average Risk Unless Otherwise Noted)  Diabetes Screening Any 1 risk factor: hypertension, dyslipidemia, obesity, high glucose; or Any 2 risk factors: >=64yo, overweight, family history diabetes (covered every 6 months)   Hepatitis C Screening Any 1 risk factor: 1) blood transfusion before 1992,   2) current or past injection drug use (annually for high risk; if born between [birth year]35-1194, see above for status).   Vaccine: Hepatitis B As necessary if at-risk: hemophilia, ESRD, diabetes, living with individual infected with hep B, healthcare worker with frequent contact with blood/bodily fluids (series covered once)   Sexually Transmitted Diseases (STDs) As  necessary chlamydia, gonorrhea, syphilis, hepatitis B (covered annually)  HIV if any 1 risk factor present: 1) <14yo or >66yo and at increased risk or 2) 15-66yo and ask for it (covered annually)   Lung Cancer Screening Low dose chest CT if all three risk factors: 1) 50-78yo, 2) smoker or quit within last 15y, 3) >=20 pack years (covered annually).  No results found for this or any previous visit.       Cholesterol Screening No signs or symptoms of cardiovascular disease (screening covered every 5 years).   Prostate Cancer Screening >= 49yo if patient desires test after weighting potential harms and benefits (covered annually)   Abdominal Aortic Aneurysm Screening Any 1 risk factor: 1) family history of AAA or 2) 65-74yo and smoked 100+ cigarettes in a lifetime (covered once).   No results found for this or any previous visit.   No results found for this or any previous visit.         Health Risk Factors with Personalized Education:  ----------------------------------------------------------------------------------------------------------------------  Controlling Your Blood Pressure  Maintain a normal weight (body mass index between 18.5 and 24.9).  Eat more fruit, vegetables and low-fat dairy.  Eat less saturated fat and total fat.  Lower your sodium (salt) intake.  Try to stay under 1500 mg per day, but if you cannot get your intake to be that low, at least lower it by 1000 mg.  Stay active.  Try to get at least 90 to 150 minutes of exercise per week.  Try brisk walking, swimming, bicycling or dancing.  Limit alcohol intake.  When you do consume alcohol, drink no more than 2 drinks per day.  If you have been prescribed medication, take it regularly and exactly as prescribed.  Let your PCP know if you have any problems or questions about your medication.  Check your blood pressure at home or at the store.  Write down your readings and share them with your  PCP  ----------------------------------------------------------------------------------------------------------------------  Controlling Your Cholesterol  Reduce the amount of saturated and trans fat in your diet.  Limit intake of red meat.  Consume only low-fat or non-fat/skim dairy.  Limit fried food.  Cook with vegetable oils.  Reduce your intake of sugary foods, sugary drinks and alcohol.  Eat a diet high in fruit, vegetables and whole grains.  Get protein from fish, poultry and a small portion of nuts.  Stay active.  Try to get at least 90 to 150 minutes of exercise per week.  Try brisk walking, swimming, bicycling or dancing.  Maintain a healthy weight by balancing your diet and exercise.  If you have been prescribed medication, take it regularly and exactly as prescribed. Let your PCP know if you have any problems or questions about your medication.  It’s important to know your cholesterol numbers.  When recommended by your PCP, get the cholesterol blood test.  ----------------------------------------------------------------------------------------------------------------------  Reducing Your Risk of Falls  Tell your PCP if any of your medications make you feel tired, dizzy, lightheaded or off-balance.  Maintain coordination, flexibility and balance by ensuring regular physical activity.  Limit alcohol intake to 1 drink per day.  Consider avoiding all alcohol intake.  Ensure good vision.  Visit an ophthalmologist or optometrist regularly for vision screening or to make sure your glasses / contact lens prescription is correct.  If you need glasses or contacts, wear them.  When you get new glasses or contacts, take time to get used to them.  Do not wear sunglasses or tinted lenses when indoors.  Ensure good hearing.  Have your hearing checked if you are having trouble hearing, or family and friends think you cannot hear them.  If you need a hearing aid, be sure it fits well and wear it.  Get enough rest.   Ensure about 7-9 hours of sleep every day.  Get up slowly from your bed or chairs.  Do not start walking until you are sure you feel steady.  Wear non-skid, rubber-soled, low-heeled shoes.  Do not walk in socks, or in shoes and slippers with smooth soles.  If your PCP or therapist recommends using a cane or walker, use it regularly.  Make your home safer.  Increase lighting throughout the house, especially at the top and bottom of stairs.  Ensure lighting is easily turned on when getting up in the middle of the night.  Make sure there are two secure rails on all stairs.  Install grab bars in the bathtub / shower and near the toilet.  Consider using a shower chair and / or a hand-held shower.  Spread sand or salt on icy surfaces.  Beware of wet surfaces, which can be icy.  Tell your PCP if you have fallen.

## 2025-02-07 LAB
ALBUMIN SERPL-MCNC: 4.7 G/DL (ref 3.9–4.9)
ALP SERPL-CCNC: 72 IU/L (ref 44–121)
ALT SERPL-CCNC: 34 IU/L (ref 0–44)
APPEARANCE UR: CLEAR
AST SERPL-CCNC: 25 IU/L (ref 0–40)
BACTERIA #/AREA URNS HPF: NORMAL /[HPF]
BILIRUB SERPL-MCNC: 0.6 MG/DL (ref 0–1.2)
BILIRUB UR QL STRIP: NEGATIVE
BUN SERPL-MCNC: 16 MG/DL (ref 8–27)
BUN/CREAT SERPL: 18 (ref 10–24)
CALCIUM SERPL-MCNC: 9.5 MG/DL (ref 8.6–10.2)
CASTS URNS QL MICRO: NORMAL /LPF
CHLORIDE SERPL-SCNC: 103 MMOL/L (ref 96–106)
CHOLEST SERPL-MCNC: 170 MG/DL (ref 100–199)
CO2 SERPL-SCNC: 22 MMOL/L (ref 20–29)
COLOR UR: YELLOW
CREAT SERPL-MCNC: 0.88 MG/DL (ref 0.76–1.27)
EGFRCR SERPLBLD CKD-EPI 2021: 95 ML/MIN/1.73
EPI CELLS #/AREA URNS HPF: NORMAL /HPF (ref 0–10)
ERYTHROCYTE [DISTWIDTH] IN BLOOD BY AUTOMATED COUNT: 12.6 % (ref 11.6–15.4)
GLOBULIN SER CALC-MCNC: 2.2 G/DL (ref 1.5–4.5)
GLUCOSE SERPL-MCNC: 119 MG/DL (ref 70–99)
GLUCOSE UR QL STRIP: NEGATIVE
HBA1C MFR BLD: 6.1 % (ref 4.8–5.6)
HCT VFR BLD AUTO: 45.7 % (ref 37.5–51)
HDLC SERPL-MCNC: 61 MG/DL
HGB BLD-MCNC: 15.4 G/DL (ref 13–17.7)
HGB UR QL STRIP: NEGATIVE
KETONES UR QL STRIP: NEGATIVE
LDLC SERPL CALC-MCNC: 95 MG/DL (ref 0–99)
LEUKOCYTE ESTERASE UR QL STRIP: NEGATIVE
MCH RBC QN AUTO: 30.4 PG (ref 26.6–33)
MCHC RBC AUTO-ENTMCNC: 33.7 G/DL (ref 31.5–35.7)
MCV RBC AUTO: 90 FL (ref 79–97)
MICRO URNS: NORMAL
MICRO URNS: NORMAL
NITRITE UR QL STRIP: NEGATIVE
PH UR STRIP: 6.5 [PH] (ref 5–7.5)
PLATELET # BLD AUTO: 289 X10E3/UL (ref 150–450)
POTASSIUM SERPL-SCNC: 4.3 MMOL/L (ref 3.5–5.2)
PROT SERPL-MCNC: 6.9 G/DL (ref 6–8.5)
PROT UR QL STRIP: NEGATIVE
PSA SERPL-MCNC: 1.6 NG/ML (ref 0–4)
RBC # BLD AUTO: 5.07 X10E6/UL (ref 4.14–5.8)
RBC #/AREA URNS HPF: NORMAL /HPF (ref 0–2)
SODIUM SERPL-SCNC: 140 MMOL/L (ref 134–144)
SP GR UR STRIP: 1.02 (ref 1–1.03)
TRIGL SERPL-MCNC: 71 MG/DL (ref 0–149)
UROBILINOGEN UR STRIP-MCNC: 0.2 MG/DL (ref 0.2–1)
VLDLC SERPL CALC-MCNC: 14 MG/DL (ref 5–40)
WBC # BLD AUTO: 9.8 X10E3/UL (ref 3.4–10.8)
WBC #/AREA URNS HPF: NORMAL /HPF (ref 0–5)

## 2025-02-07 NOTE — RESULT ENCOUNTER NOTE
Cholesterol numbers are excellent this time, your LDL is below 100 which is the goal, so I think you are improved compliance with the medication has been very beneficial.  Your long-term blood sugar test or hemoglobin A1c is slightly higher at 6.1 in the prediabetic range.  Continue to work on weight loss to improve this.  The rest of your blood work is normal.

## 2025-03-31 RX ORDER — AMLODIPINE BESYLATE 5 MG/1
5 TABLET ORAL DAILY
Qty: 90 TABLET | Refills: 3 | Status: SHIPPED | OUTPATIENT
Start: 2025-03-31

## 2025-03-31 NOTE — TELEPHONE ENCOUNTER
Medicine Refill Request    Last Office Visit: 2/6/2025   Last Consult Visit: Visit date not found  Last Telemedicine Visit: 11/12/2021 Schuyler Butt, DO    Next Appointment: Visit date not found      Current Outpatient Medications:     amLODIPine (NORVASC) 5 mg tablet, TAKE ONE TABLET BY MOUTH ONE TIME DAILY, Disp: 90 tablet, Rfl: 0    aspirin 81 mg chewable tablet, Take 81 mg by mouth daily., Disp: , Rfl:     nystatin-triamcinolone (MYCOLOG II) ointment, , Disp: , Rfl:     olmesartan (BENICAR) 40 mg tablet, TAKE 1 TABLET (40 MG TOTAL) BY MOUTH DAILY., Disp: 90 tablet, Rfl: 3    rosuvastatin (CRESTOR) 40 mg tablet, Take 1 tablet (40 mg total) by mouth once daily., Disp: 90 tablet, Rfl: 3    sildenafiL (VIAGRA) 100 mg tablet, Take 1 tablet (100 mg total) by mouth daily as needed for erectile dysfunction., Disp: 10 tablet, Rfl: 3    BP Readings from Last 3 Encounters:   02/06/25 118/82   09/19/23 (!) 150/90   07/27/23 136/90       Recent Lab results:  Lab Results   Component Value Date    CHOL 170 02/06/2025   ,   Lab Results   Component Value Date    HDL 61 02/06/2025   ,   Lab Results   Component Value Date    LDLCALC 95 02/06/2025   ,   Lab Results   Component Value Date    TRIG 71 02/06/2025        Lab Results   Component Value Date    GLUCOSE 119 (H) 02/06/2025   ,   Lab Results   Component Value Date    HGBA1C 6.1 (H) 02/06/2025         Lab Results   Component Value Date    CREATININE 0.88 02/06/2025       Lab Results   Component Value Date    TSH 1.390 11/05/2015           Lab Results   Component Value Date    HGBA1C 6.1 (H) 02/06/2025

## 2025-07-21 ENCOUNTER — APPOINTMENT (OUTPATIENT)
Dept: URBAN - METROPOLITAN AREA CLINIC 203 | Age: 65
Setting detail: DERMATOLOGY
End: 2025-07-31

## 2025-07-21 DIAGNOSIS — L57.0 ACTINIC KERATOSIS: ICD-10-CM

## 2025-07-21 PROCEDURE — 17000 DESTRUCT PREMALG LESION: CPT

## 2025-07-21 PROCEDURE — OTHER LIQUID NITROGEN: OTHER

## 2025-07-21 PROCEDURE — 17003 DESTRUCT PREMALG LES 2-14: CPT

## 2025-07-21 ASSESSMENT — LOCATION SIMPLE DESCRIPTION DERM
LOCATION SIMPLE: LEFT FOREHEAD
LOCATION SIMPLE: LEFT TEMPLE

## 2025-07-21 ASSESSMENT — LOCATION DETAILED DESCRIPTION DERM
LOCATION DETAILED: LEFT FOREHEAD
LOCATION DETAILED: LEFT MID TEMPLE

## 2025-07-21 ASSESSMENT — LOCATION ZONE DERM: LOCATION ZONE: FACE
